# Patient Record
Sex: FEMALE | Race: WHITE | NOT HISPANIC OR LATINO | Employment: UNEMPLOYED | URBAN - METROPOLITAN AREA
[De-identification: names, ages, dates, MRNs, and addresses within clinical notes are randomized per-mention and may not be internally consistent; named-entity substitution may affect disease eponyms.]

---

## 2019-05-10 ENCOUNTER — APPOINTMENT (OUTPATIENT)
Dept: LAB | Facility: CLINIC | Age: 24
End: 2019-05-10
Attending: OBSTETRICS & GYNECOLOGY
Payer: COMMERCIAL

## 2019-05-10 ENCOUNTER — OFFICE VISIT (OUTPATIENT)
Dept: OBGYN | Facility: CLINIC | Age: 24
End: 2019-05-10
Attending: OBSTETRICS & GYNECOLOGY
Payer: COMMERCIAL

## 2019-05-10 VITALS
HEART RATE: 78 BPM | SYSTOLIC BLOOD PRESSURE: 118 MMHG | DIASTOLIC BLOOD PRESSURE: 78 MMHG | WEIGHT: 133.5 LBS | BODY MASS INDEX: 22.24 KG/M2 | HEIGHT: 65 IN

## 2019-05-10 DIAGNOSIS — Z12.4 SCREENING FOR MALIGNANT NEOPLASM OF CERVIX: ICD-10-CM

## 2019-05-10 DIAGNOSIS — Z01.419 ENCOUNTER FOR GYNECOLOGICAL EXAMINATION WITHOUT ABNORMAL FINDING: ICD-10-CM

## 2019-05-10 DIAGNOSIS — Z00.00 VISIT FOR PREVENTIVE HEALTH EXAMINATION: ICD-10-CM

## 2019-05-10 DIAGNOSIS — Z12.39 BREAST CANCER SCREENING: ICD-10-CM

## 2019-05-10 DIAGNOSIS — R10.2 PELVIC PAIN IN FEMALE: Primary | ICD-10-CM

## 2019-05-10 PROCEDURE — 86481 TB AG RESPONSE T-CELL SUSP: CPT | Performed by: STUDENT IN AN ORGANIZED HEALTH CARE EDUCATION/TRAINING PROGRAM

## 2019-05-10 PROCEDURE — 36415 COLL VENOUS BLD VENIPUNCTURE: CPT | Performed by: STUDENT IN AN ORGANIZED HEALTH CARE EDUCATION/TRAINING PROGRAM

## 2019-05-10 PROCEDURE — G0145 SCR C/V CYTO,THINLAYER,RESCR: HCPCS | Performed by: STUDENT IN AN ORGANIZED HEALTH CARE EDUCATION/TRAINING PROGRAM

## 2019-05-10 ASSESSMENT — ANXIETY QUESTIONNAIRES
5. BEING SO RESTLESS THAT IT IS HARD TO SIT STILL: NOT AT ALL
1. FEELING NERVOUS, ANXIOUS, OR ON EDGE: MORE THAN HALF THE DAYS
3. WORRYING TOO MUCH ABOUT DIFFERENT THINGS: SEVERAL DAYS
2. NOT BEING ABLE TO STOP OR CONTROL WORRYING: SEVERAL DAYS
6. BECOMING EASILY ANNOYED OR IRRITABLE: NOT AT ALL
7. FEELING AFRAID AS IF SOMETHING AWFUL MIGHT HAPPEN: NOT AT ALL
GAD7 TOTAL SCORE: 4

## 2019-05-10 ASSESSMENT — PATIENT HEALTH QUESTIONNAIRE - PHQ9
SUM OF ALL RESPONSES TO PHQ QUESTIONS 1-9: 3
5. POOR APPETITE OR OVEREATING: NOT AT ALL

## 2019-05-10 ASSESSMENT — MIFFLIN-ST. JEOR: SCORE: 1361.43

## 2019-05-10 NOTE — PATIENT INSTRUCTIONS
Results of pap smear will be available in about 5-7 days. We ordered at CT of your abdomen/pelvis today.       PREVENTIVE HEALTH RECOMMENDATIONS:   Most women need a yearly breast and pelvic exam.    A PAP screen, a test done DURING a pelvic exam, is NO longer recommended yearly.    March 2013, screening guidelines recommended by ACOG for PAP screen are:    1) First pap at age 21.    2) Pap every 3 years until age 30.    3) After age 30, pap every 3 years or Pap with HR HPV screen every 5 years until age 65.  4) Women do NOT need a vaginal Pap screen after a hysterectomy (surgical removal of the uterus) when they have not had cancer.    Exceptions:  1) Yearly pap if HIV+ or immunosuppressed secondary to organ transplant  2) ZANDER II-III continue routine screening for 20 years.    I encourage you continue looking for opportunities to choose a healthy lifestyle:       * Choose to eat a heart healthy diet. Check out the FOOD PLATE guidelines at: http://www.[employer] Bio[employer]plate.gov/ for helpful hints on weight and cholesterol management.  Balance your caloric intake with exercise to maintain a BMI in the 22 to 26 range. For bone health: Eat calcium-rich foods like yogurt, broccoli or take chewable calcium pills (500 to 600 mg) twice a day with food.       * Exercise for at least an average of 30 minutes a day, 5 days of the week. This will help you control your weight, release stress, and help prevent disease.      * Take a Vitamin D3 supplement daily fall through spring and during summer unless you pmxy35-87' full body sun exposure to skin without sunscreen.      * DO wear sunscreen to prevent skin cancer after the first 15-30 minutes.      * Identify stressors in your life, find ways to release the stress, and, make time for yourself. PLEASE ask for help if mood changes last longer than two weeks.     * Limit alcohol to one drink per day.  No smoking.  Avoid second hand smoke. If you smoke, ask for help to stop.       *  If  you are in a sexual relationship, talk with your partner about possible infection risks and take action to protect yourself from exposure to a sexual infection.    Please request an infection screen for STIs (sexually transmitted infections) if you are less than age 26 OR believe that you may be at risk.     Get a flu shot each year. Get a tetanus shot every 10 years. EVERYONE needs a pertussis (Whooping cough) booster.    See your dentist twice a year for an exam and preventive care cleaning.     Consider the following screen tests:    1) cholesterol test every 5 years.     2) yearly mammogram after age 40 unless you have identified risks.    3) colonoscopy every 10 years after age 50 unless you have identified risks.    4) diabetes blood test screening if you are at risk for diabetes.      Additional information that you may also find helpful:  The Internet now gives us access to LOTS of information -- some of it helpful, research documented and also plenty of harmful, anecdotal information that may not pertain to your situtaion. Consider visiting the following websites for accurate health information:    www.vitamindcouncil.org/ : Info and ongoing research re Vitamin D    www.fairview.org : Up to date and easily searchable information on multiple topics.    www.medlineplus.gov : medication info, interactive tutorials, watch real surgeries online    www.cdc.gov : public health info, travel advisories, epidemics (H1N1)    www.manolo/std.org: current research re diagnosis, treatment and prevention of sexually contacted infections.    www.health.state.[place] : [place] dept of heatl, public health issues in [place], N1N1    www.familydoctor.org : good info from the Academy of Family Physicians

## 2019-05-10 NOTE — LETTER
5/10/2019     RE: Radha Blanco  [address] [id number]     Dear Colleague,    Thank you for referring your patient, Radha Blanco, to the [facility] at [facility]. Please see a copy of my visit note below.    [facility]  Annual Exam    HPI:    Radha Blanco is a 23 year old G0 young woman, here for well woman exam. Reports she is overall doing well, but has been having cyclical abdominal-pelvic pain. Pain starts between day 6-15 of menstrual cycle. Starts at the umbilicus and then migrates to RLQ consistently. Starts dull and then becomes sharp. Lasts for a few hours (up to 12 hours), improves some with ibuprofen and heat. Feels better if she rests and lays in fetal position. Associated with nausea but no vomiting. Intermittently has diarrhea but no constipation. Denies any dysuria, pain during periods, intermenstrual bleeding, abnormal discharge. Also has low back pain that is not associated with this pain. Feels like it is worse after sitting for long periods of time.     GYN History  - LMP: Patient's last menstrual period was 2019.  - Menses: Menarche at 14, cycles q 25-26 days, lasting 3 days, with light flow, no clots. PMS symptoms: headaches, premenstrual cramps (mild), depressed mood that is only for a few days before period  - Pap Smears:  No pap smear. HPV vaccine x3.  - Contraception: Never used.  - Sexual Activity: Not currently. Has never been sexually active.   - Hx UTIs: No    OBHx  OB History    Para Term  AB Living   0 0 0 0 0 0   SAB TAB Ectopic Multiple Live Births   0 0 0 0 0       Past Medical History  Past Medical History:   Diagnosis Date     NO ACTIVE PROBLEMS        Past Surgical History  Past Surgical History:   Procedure Laterality Date     SURGICAL HISTORY OF -       oral surgery       Medications  Current Outpatient Medications   Medication     epinephrine (EPIPEN JR) 0.15 MG/0.3ML  YAN     No current facility-administered medications for this visit.        Allergies  Bees, anaphylaxis     Social History  Pursuing Master's of Theology. Graduated with Bachelor of Civil Engineering    Lives in [facility] roommate moving in  Family in [place]    Family History  Family History   Problem Relation Age of Onset     Diabetes Mother         gestational only     Asthma Mother      Hypertension Maternal Grandmother      Diabetes Maternal Grandmother      C.A.D. Maternal Grandfather 49        cardiac arrest      Hypertension Paternal Grandmother      Cancer Paternal Grandfather         lung, brain and liver - smoker     Cerebrovascular Disease Maternal Aunt 40        vertebrae occlusion- stroke     Asthma Paternal Uncle        ROS: Review Of Systems  Skin: negative for, acne, rash, itching, lumps or bumps  Eyes: negative for, visual blurring, double vision, eye pain, wears contacts  Ears/Nose/Throat: negative for, nasal congestion, hearing loss, sinus trouble, dental problem  Respiratory: No shortness of breath, dyspnea on exertion, cough, or hemoptysis  Cardiovascular: negative for, palpitations, chest pain, dyspnea on exertion and orthopnea  Gastrointestinal: negative for, poor appetite, heartburn, +nausea and occasional diarrhea  Genitourinary: negative for, nocturia, dysuria, frequency, urgency and hematuria  Musculoskeletal: negative for, neck pain, joint pain and joint swelling, +low back pain  Neurologic: negative for, headaches, syncope and local weakness  Psychiatric: negative for, feeling anxious, thoughts of self-harm and abusive relationship  Hematologic/Lymphatic/Immunologic: negative for, bleeding disorder, night sweats and weight loss  Endocrine: negative for, cold intolerance, heat intolerance and polydipsia    Preventative Health  Current or historical sexual, physical or mental abuse: No  Feelings of depression: Intermittently before periods, no thoughts of harm and  "does not cause her much distress, able to contract for safety  Seat belt usage: Yes  Diet: Well balanced,   Exercise: 30 minutes, 4-5x per week    Physical Exam  /78   Pulse 78   Ht 1.651 m (5' 5\")   Wt 60.6 kg (133 lb 8 oz)   LMP 05/01/2019   BMI 22.22 kg/m      /72 HR 78   Gen: Well-appearing, NAD  HEENT: Normocephalic, atraumatic, no oral lesions, normal dentition  Neck: Thyroid is not enlarged, no appreciable masses palpated. Non-tender  CV:  RRR, no m/r/g auscultated  Pulm: CTAB, no w/r/r auscultated  Abd: Soft, non-tender, non-distended. No hepatomegaly. No masses. Pain non-reproducible.  Ext: No LE edema, extremities warm and well perfused  Neuro: CNs intact, normal strength in BL UE and LE, normal sensation to light touch, normal gait  Skin:   Few normal nevi on back, no rash or scaling    Breast: Symmetric, no nipple discharge or retraction, no axillary lymphadenopathy, no palpable nodules or masses bilaterally    Pelvic:  Normal appearing external female genitalia. Normal hair distribution. Vagina is without lesions. Thin, clear-white discharge. Cervix non-parous, no lesions, no cervical motion tenderness. Uterus is small, mobile, non-tender. No adnexal tenderness or masses. Pelvic pain non-reproducible.    Current BMI: Body mass index is 22.22 kg/m .  --Normal weight = 18.5-24.9    Assessment/Plan  Radha Blanco is a 23 year old G0 female here for annual exam and with pelvic pain of unknown etiology.      Routine Health Maintenance:   - No prior pap. Pap collected today.   - Lab Work:  Quant GOLD   - Normal clinical breast and pelvic exam  - s/p HPV vaccination series    Pelvic pain: RLQ pain that is somewhat cyclical. Suspect GI vs ovarian etiology. Likely due to normal, monthly ovulation. Previously evaluated with transabdominal US in [place], but patient does not have results. Obviously pain not likely due to torsion, PID, TOA as she was not admitted or treated for her pain and no " previous sexual activity. Pelvic exam and vital signs normal today. Associated nausea and the nature of her pain (starts umbilical and localizes to RLQ consistently) makes a GI source, like chronic appendicitis, a possibility.  - CT A/P ordered. Patient will get CT in [place] and then release results     Follow Up: In one year for annual, or sooner as needed    Patient discussed and staffed with Dr. Dcelan Rothman MD, MPH  OBGYN PGY-1  5/10/2019 1:56 PM     I have seen and examined the patient with the resident. I have reviewed, edited, and agree with the note.   My findings are: appears well. Abdomen: soft NT ND  EG wnl vagina wnl cervix is small and nulliparous.   Pat Manriquez MD

## 2019-05-10 NOTE — PROGRESS NOTES
[facility]  Annual Exam    HPI:    Radha Blanco is a 23 year old G0 young woman, here for well woman exam. Reports she is overall doing well, but has been having cyclical abdominal-pelvic pain. Pain starts between day 6-15 of menstrual cycle. Starts at the umbilicus and then migrates to RLQ consistently. Starts dull and then becomes sharp. Lasts for a few hours (up to 12 hours), improves some with ibuprofen and heat. Feels better if she rests and lays in fetal position. Associated with nausea but no vomiting. Intermittently has diarrhea but no constipation. Denies any dysuria, pain during periods, intermenstrual bleeding, abnormal discharge. Also has low back pain that is not associated with this pain. Feels like it is worse after sitting for long periods of time.     GYN History  - LMP: Patient's last menstrual period was 2019.  - Menses: Menarche at 14, cycles q 25-26 days, lasting 3 days, with light flow, no clots. PMS symptoms: headaches, premenstrual cramps (mild), depressed mood that is only for a few days before period  - Pap Smears:  No pap smear. HPV vaccine x3.  - Contraception: Never used.  - Sexual Activity: Not currently. Has never been sexually active.   - Hx UTIs: No    OBHx  OB History    Para Term  AB Living   0 0 0 0 0 0   SAB TAB Ectopic Multiple Live Births   0 0 0 0 0       Past Medical History  Past Medical History:   Diagnosis Date     NO ACTIVE PROBLEMS        Past Surgical History  Past Surgical History:   Procedure Laterality Date     SURGICAL HISTORY OF -       oral surgery       Medications  Current Outpatient Medications   Medication     epinephrine (EPIPEN JR) 0.15 MG/0.3ML YAN     No current facility-administered medications for this visit.        Allergies  Bees, anaphylaxis     Social History  Pursuing Master's of Theology. Graduated with Bachelor of Civil Engineering    Lives in [facility] roommate moving in  Family in [facility]  "History  Family History   Problem Relation Age of Onset     Diabetes Mother         gestational only     Asthma Mother      Hypertension Maternal Grandmother      Diabetes Maternal Grandmother      C.A.D. Maternal Grandfather 49        cardiac arrest      Hypertension Paternal Grandmother      Cancer Paternal Grandfather         lung, brain and liver - smoker     Cerebrovascular Disease Maternal Aunt 40        vertebrae occlusion- stroke     Asthma Paternal Uncle        ROS: Review Of Systems  Skin: negative for, acne, rash, itching, lumps or bumps  Eyes: negative for, visual blurring, double vision, eye pain, wears contacts  Ears/Nose/Throat: negative for, nasal congestion, hearing loss, sinus trouble, dental problem  Respiratory: No shortness of breath, dyspnea on exertion, cough, or hemoptysis  Cardiovascular: negative for, palpitations, chest pain, dyspnea on exertion and orthopnea  Gastrointestinal: negative for, poor appetite, heartburn, +nausea and occasional diarrhea  Genitourinary: negative for, nocturia, dysuria, frequency, urgency and hematuria  Musculoskeletal: negative for, neck pain, joint pain and joint swelling, +low back pain  Neurologic: negative for, headaches, syncope and local weakness  Psychiatric: negative for, feeling anxious, thoughts of self-harm and abusive relationship  Hematologic/Lymphatic/Immunologic: negative for, bleeding disorder, night sweats and weight loss  Endocrine: negative for, cold intolerance, heat intolerance and polydipsia    Preventative Health  Current or historical sexual, physical or mental abuse: No  Feelings of depression: Intermittently before periods, no thoughts of harm and does not cause her much distress, able to contract for safety  Seat belt usage: Yes  Diet: Well balanced,   Exercise: 30 minutes, 4-5x per week    Physical Exam  /78   Pulse 78   Ht 1.651 m (5' 5\")   Wt 60.6 kg (133 lb 8 oz)   LMP 05/01/2019   BMI 22.22 kg/m     /72 HR 78 "   Gen: Well-appearing, NAD  HEENT: Normocephalic, atraumatic, no oral lesions, normal dentition  Neck: Thyroid is not enlarged, no appreciable masses palpated. Non-tender  CV:  RRR, no m/r/g auscultated  Pulm: CTAB, no w/r/r auscultated  Abd: Soft, non-tender, non-distended. No hepatomegaly. No masses. Pain non-reproducible.  Ext: No LE edema, extremities warm and well perfused  Neuro: CNs intact, normal strength in BL UE and LE, normal sensation to light touch, normal gait  Skin:   Few normal nevi on back, no rash or scaling    Breast: Symmetric, no nipple discharge or retraction, no axillary lymphadenopathy, no palpable nodules or masses bilaterally    Pelvic:  Normal appearing external female genitalia. Normal hair distribution. Vagina is without lesions. Thin, clear-white discharge. Cervix non-parous, no lesions, no cervical motion tenderness. Uterus is small, mobile, non-tender. No adnexal tenderness or masses. Pelvic pain non-reproducible.    Current BMI: Body mass index is 22.22 kg/m .  --Normal weight = 18.5-24.9    Assessment/Plan  Radha Blanco is a 23 year old G0 female here for annual exam and with pelvic pain of unknown etiology.      Routine Health Maintenance:   - No prior pap. Pap collected today.   - Lab Work:  Quant GOLD   - Normal clinical breast and pelvic exam  - s/p HPV vaccination series    Pelvic pain: RLQ pain that is somewhat cyclical. Suspect GI vs ovarian etiology. Likely due to normal, monthly ovulation. Previously evaluated with transabdominal US in [place], but patient does not have results. Obviously pain not likely due to torsion, PID, TOA as she was not admitted or treated for her pain and no previous sexual activity. Pelvic exam and vital signs normal today. Associated nausea and the nature of her pain (starts umbilical and localizes to RLQ consistently) makes a GI source, like chronic appendicitis, a possibility.  - CT A/P ordered. Patient will get CT in [place] and then release  results     Follow Up: In one year for annual, or sooner as needed    Patient discussed and staffed with Dr. Declan Rothman MD, MPH  OBGYN PGY-1  5/10/2019 1:56 PM     I have seen and examined the patient with the resident. I have reviewed, edited, and agree with the note.   My findings are: appears well. Abdomen: soft NT ND  EG wnl vagina wnl cervix is small and nulliparous.   Pat Manriquez MD

## 2019-05-11 ASSESSMENT — ANXIETY QUESTIONNAIRES: GAD7 TOTAL SCORE: 4

## 2019-05-13 LAB
GAMMA INTERFERON BACKGROUND BLD IA-ACNC: 0.22 IU/ML
M TB IFN-G BLD-IMP: NEGATIVE
M TB IFN-G CD4+ BCKGRND COR BLD-ACNC: >10 IU/ML
MITOGEN IGNF BCKGRD COR BLD-ACNC: 0 IU/ML
MITOGEN IGNF BCKGRD COR BLD-ACNC: 0.01 IU/ML

## 2019-05-15 LAB
COPATH REPORT: NORMAL
PAP: NORMAL

## 2019-07-06 ENCOUNTER — OFFICE VISIT (OUTPATIENT)
Dept: URGENT CARE | Facility: URGENT CARE | Age: 24
End: 2019-07-06
Payer: COMMERCIAL

## 2019-07-06 VITALS
BODY MASS INDEX: 21.52 KG/M2 | TEMPERATURE: 97.9 F | HEART RATE: 77 BPM | DIASTOLIC BLOOD PRESSURE: 74 MMHG | RESPIRATION RATE: 16 BRPM | WEIGHT: 129.3 LBS | SYSTOLIC BLOOD PRESSURE: 110 MMHG | OXYGEN SATURATION: 100 %

## 2019-07-06 DIAGNOSIS — B96.89 BV (BACTERIAL VAGINOSIS): ICD-10-CM

## 2019-07-06 DIAGNOSIS — R10.84 ABDOMINAL PAIN, GENERALIZED: Primary | ICD-10-CM

## 2019-07-06 DIAGNOSIS — N76.0 BV (BACTERIAL VAGINOSIS): ICD-10-CM

## 2019-07-06 LAB
ALBUMIN SERPL-MCNC: 3.9 G/DL (ref 3.4–5)
ALBUMIN UR-MCNC: NEGATIVE MG/DL
ALP SERPL-CCNC: 80 U/L (ref 40–150)
ALT SERPL W P-5'-P-CCNC: 42 U/L (ref 0–50)
ANION GAP SERPL CALCULATED.3IONS-SCNC: 4 MMOL/L (ref 3–14)
APPEARANCE UR: CLEAR
AST SERPL W P-5'-P-CCNC: 29 U/L (ref 0–45)
BASOPHILS # BLD AUTO: 0 10E9/L (ref 0–0.2)
BASOPHILS NFR BLD AUTO: 0.4 %
BILIRUB SERPL-MCNC: 0.2 MG/DL (ref 0.2–1.3)
BILIRUB UR QL STRIP: NEGATIVE
BUN SERPL-MCNC: 14 MG/DL (ref 7–30)
CALCIUM SERPL-MCNC: 8.9 MG/DL (ref 8.5–10.1)
CHLORIDE SERPL-SCNC: 106 MMOL/L (ref 94–109)
CO2 SERPL-SCNC: 30 MMOL/L (ref 20–32)
COLOR UR AUTO: YELLOW
CREAT SERPL-MCNC: 0.78 MG/DL (ref 0.52–1.04)
DIFFERENTIAL METHOD BLD: NORMAL
EOSINOPHIL # BLD AUTO: 0.3 10E9/L (ref 0–0.7)
EOSINOPHIL NFR BLD AUTO: 5.6 %
ERYTHROCYTE [DISTWIDTH] IN BLOOD BY AUTOMATED COUNT: 12.7 % (ref 10–15)
GFR SERPL CREATININE-BSD FRML MDRD: >90 ML/MIN/{1.73_M2}
GLUCOSE SERPL-MCNC: 89 MG/DL (ref 70–99)
GLUCOSE UR STRIP-MCNC: NEGATIVE MG/DL
HCT VFR BLD AUTO: 40.5 % (ref 35–47)
HETEROPH AB SER QL: NEGATIVE
HGB BLD-MCNC: 13.4 G/DL (ref 11.7–15.7)
HGB UR QL STRIP: NEGATIVE
KETONES UR STRIP-MCNC: NEGATIVE MG/DL
LEUKOCYTE ESTERASE UR QL STRIP: NEGATIVE
LYMPHOCYTES # BLD AUTO: 1.8 10E9/L (ref 0.8–5.3)
LYMPHOCYTES NFR BLD AUTO: 32.2 %
MCH RBC QN AUTO: 29.3 PG (ref 26.5–33)
MCHC RBC AUTO-ENTMCNC: 33.1 G/DL (ref 31.5–36.5)
MCV RBC AUTO: 88 FL (ref 78–100)
MONOCYTES # BLD AUTO: 1 10E9/L (ref 0–1.3)
MONOCYTES NFR BLD AUTO: 17.6 %
NEUTROPHILS # BLD AUTO: 2.4 10E9/L (ref 1.6–8.3)
NEUTROPHILS NFR BLD AUTO: 44.2 %
NITRATE UR QL: NEGATIVE
PH UR STRIP: 6.5 PH (ref 5–7)
PLATELET # BLD AUTO: 317 10E9/L (ref 150–450)
POTASSIUM SERPL-SCNC: 4.2 MMOL/L (ref 3.4–5.3)
PROT SERPL-MCNC: 8 G/DL (ref 6.8–8.8)
RBC # BLD AUTO: 4.58 10E12/L (ref 3.8–5.2)
SODIUM SERPL-SCNC: 140 MMOL/L (ref 133–144)
SOURCE: NORMAL
SP GR UR STRIP: 1.01 (ref 1–1.03)
SPECIMEN SOURCE: ABNORMAL
UROBILINOGEN UR STRIP-ACNC: 0.2 EU/DL (ref 0.2–1)
WBC # BLD AUTO: 5.6 10E9/L (ref 4–11)
WET PREP SPEC: ABNORMAL

## 2019-07-06 PROCEDURE — 36415 COLL VENOUS BLD VENIPUNCTURE: CPT | Performed by: PHYSICIAN ASSISTANT

## 2019-07-06 PROCEDURE — 99203 OFFICE O/P NEW LOW 30 MIN: CPT | Performed by: PHYSICIAN ASSISTANT

## 2019-07-06 PROCEDURE — 87210 SMEAR WET MOUNT SALINE/INK: CPT | Performed by: PHYSICIAN ASSISTANT

## 2019-07-06 PROCEDURE — 81003 URINALYSIS AUTO W/O SCOPE: CPT | Performed by: PHYSICIAN ASSISTANT

## 2019-07-06 PROCEDURE — 85025 COMPLETE CBC W/AUTO DIFF WBC: CPT | Performed by: PHYSICIAN ASSISTANT

## 2019-07-06 PROCEDURE — 86308 HETEROPHILE ANTIBODY SCREEN: CPT | Performed by: PHYSICIAN ASSISTANT

## 2019-07-06 PROCEDURE — 80053 COMPREHEN METABOLIC PANEL: CPT | Performed by: PHYSICIAN ASSISTANT

## 2019-07-06 RX ORDER — METRONIDAZOLE 500 MG/1
500 TABLET ORAL 2 TIMES DAILY
Qty: 14 TABLET | Refills: 0 | Status: SHIPPED | OUTPATIENT
Start: 2019-07-06 | End: 2019-08-19

## 2019-07-06 NOTE — PATIENT INSTRUCTIONS
Better in 2-3 days    If not follow     Red flags and emergent follow up discussed, and understood by patient  Follow up with PCP if symptoms worsen or fail to improve        Patient Education     Bacterial Vaginosis    You have a vaginal infection called bacterial vaginosis (BV). Both good and bad bacteria are present in a healthy vagina. BV occurs when these bacteria get out of balance. The number of bad bacteria increase. And the number of good bacteria decrease. Although BV is associated with sexual activity, it is not a sexually transmitted disease.  BV may or may not cause symptoms. If symptoms do occur, they can include:    Thin, gray, milky-white, or sometimes green discharge    Unpleasant odor or  fishy  smell    Itching, burning, or pain in or around the vagina  It is not known what causes BV, but certain factors can make the problem more likely. This can include:    Douching    Having sex with a new partner    Having sex with more than one partner  BV will sometimes go away on its own. But treatment is usually recommended. This is because untreated BV can increase the risk of more serious health problems such as:    Pelvic inflammatory disease (PID)     delivery (giving birth to a baby early if you re pregnant)    HIV and certain other sexually transmitted diseases (STDs)    Infection after surgery on the reproductive organs  Home care  General care    BV is most often treated with medicines called antibiotics. These may be given as pills or as a vaginal cream. If antibiotics are prescribed, be sure to use them exactly as directed. Also, be sure to complete all of the medicine, even if your symptoms go away.    Don't douche or having sex during treatment.    If you have sex with a female partner, ask your healthcare provider if she should also be treated.  Prevention    Don't douche.    Don't have sex. If you do have sex, then take steps to lower your risk:  ? Use condoms when having sex.  ? Limit  the number of sexual partners you have.  Follow-up care  Follow up with your healthcare provider, or as advised.  When to seek medical advice  Call your healthcare provider right away if:    You have a fever of 100.4 F (38 C) or higher, or as directed by your provider.    Your symptoms worsen, or they don t go away within a few days of starting treatment.    You have new pain in the lower belly or pelvic region.    You have side effects that bother you or a reaction to the pills or cream you re prescribed.    You or any partners you have sex with have new symptoms, such as a rash, joint pain, or sores.  Date Last Reviewed: 10/1/2017    4219-8718 [employer] [address] [place] [address], [place] [address]. All rights reserved. This information is not intended as a substitute for professional medical care. Always follow your healthcare professional's instructions.           Patient Education     Unknown Causes of Abdominal Pain (Female)    The exact cause of your belly (abdominal) pain is not clear. This does not mean that this is something to worry about. Everyone likes to know the exact cause of the problem. But sometimes with belly pain, there is no clear-cut cause, and this could be a good thing. The good news is that your symptoms can be treated, and you will feel better.   Your condition does not seem serious now. But sometimes the signs of a serious problem may take more time to appear. For this reason, it is important for you to watch for any new symptoms, problems, or worsening of your condition.  Over the next few days, the abdominal pain may come and go. Or it may be constant. Other common symptoms can include nausea and vomiting. Sometimes it can be difficult to tell if you feel nauseous. You may just feel bad and not connect that feeling to nausea. Constipation, diarrhea, and a fever may go along with the pain.  The pain may continue even if treated correctly over the following days. Depending on  how things go, sometimes the cause can become clear and may need more or different treatment. Additional evaluations, medicines, or tests may also be needed.  Home care  Your healthcare provider may prescribe medicine for pain, symptoms, or an infection.  Follow the healthcare provider's instructions for taking these medicines.  General care    Rest as much as you can until your next exam. No strenuous activities.    Try to find positions that ease discomfort. A small pillow placed on the abdomen may help relieve pain.    Something warm on your abdomen (such as a heating pad) may help, but be careful not to burn yourself.  Diet    Don t force yourself to eat, especially if having cramps, vomiting, or diarrhea.    Water is important so you don't get dehydrated. Soup may also be good. Sports drinks may also help, especially if they are not too acidic. Don't drink sugary drinks as this can make things worse. Take liquids in small amounts. Don t guzzle them.    Caffeine sometimes makes the pain and cramping worse.    Don t take dairy products if you have vomiting or diarrhea.    Don't eat large amounts at a time. Wait a few minutes between bites.    Eat a diet low in fiber (called a low-residue diet). Foods allowed include refined breads, white rice, fruit and vegetable juices without pulp, tender meats. These foods will pass more easily through the intestine.    Don t have whole-grain foods, whole fruits and vegetables, meats, seeds and nuts, fried or fatty foods, dairy, alcohol and spicy foods until your symptoms go away.  Follow-up care  Follow up with your healthcare provider, or as advised, if your pain does not begin to improve in the next 24 hours.  Call 911  Call 911 if any of these occur:    Trouble breathing    Confusion    Fainting or loss of consciousness    Rapid heart rate    Seizure  When to seek medical advice  Call your healthcare provider right away if any of these occur:    Pain gets worse or moves to  the right lower abdomen    New or worsening vomiting or diarrhea    Swelling of the abdomen    Unable to pass stool for more than 3 days    Fever of 100.4 F (38 C) or higher, or as directed by your healthcare provider.    Blood in vomit or bowel movements (dark red or black color)    Yellow color of eyes and skin (jaundice)    Weakness, dizziness    Chest, arm, back, neck, or jaw pain    Unexpected vaginal bleeding or missed period    Can't keep down liquids or water and you are getting dehydrated  Date Last Reviewed: 6/1/2018 2000-2018 [employer]. [address] [place] [address]. All rights reserved. This information is not intended as a substitute for professional medical care. Always follow your healthcare professional's instructions.

## 2019-07-06 NOTE — PROGRESS NOTES
SUBJECTIVE  HPI:  Radha Blanco is a 23 year old female who presents with the CC of abdominal pain.  On and off pain for years.  Now occurring consistently over the last 7 days.  Episodes lasting 15-20 minutes. Diarrhea.  No urinary changes. No vaginal symptoms.  LMP in 6/15.  No fever, vomiting.  At its worst an 8/10.  Currently 2/10.  Pain is sharp, but diffuse.    Fetal position helps a little, nothing else.  Eating makes it worse.  No blood.  Last BM was last night.   No GI meds.  Has not tried anything for GI.      Primary in [place]    Past Medical History:   Diagnosis Date     NO ACTIVE PROBLEMS      Current Outpatient Medications   Medication Sig Dispense Refill     epinephrine (EPIPEN JR) 0.15 MG/0.3ML YAN inject  as directed.       Social History     Tobacco Use     Smoking status: Never Smoker     Smokeless tobacco: Never Used   Substance Use Topics     Alcohol use: No       ROS:  Review of systems negative except as stated above.    OBJECTIVE:  /74   Pulse 77   Temp 97.9  F (36.6  C) (Oral)   Resp 16   Wt 58.7 kg (129 lb 4.8 oz)   LMP 06/15/2019   SpO2 100%   BMI 21.52 kg/m    GENERAL APPEARANCE: healthy, alert and no distress  EYES: conjunctiva clear  HENT: ear canals and TM's normal.  Nose and mouth without ulcers, erythema or lesions  NECK: supple, nontender, no lymphadenopathy  RESP: lungs clear to auscultation - no rales, rhonchi or wheezes  CV: regular rates and rhythm, normal S1 S2, no murmur noted  ABDOMEN:  soft, nontender, no HSM or masses and bowel sounds normal  NEURO: Normal strength and tone  SKIN: no suspicious lesions or rashes    Results for orders placed or performed in visit on 07/06/19   CBC with platelets and differential   Result Value Ref Range    WBC 5.6 4.0 - 11.0 10e9/L    RBC Count 4.58 3.8 - 5.2 10e12/L    Hemoglobin 13.4 11.7 - 15.7 g/dL    Hematocrit 40.5 35.0 - 47.0 %    MCV 88 78 - 100 fl    MCH 29.3 26.5 - 33.0 pg    MCHC 33.1 31.5 - 36.5 g/dL    RDW 12.7  10.0 - 15.0 %    Platelet Count 317 150 - 450 10e9/L    Diff Method PENDING    *UA reflex to Microscopic and Culture ([facility] (except [place] and [place])   Result Value Ref Range    Color Urine Yellow     Appearance Urine Clear     Glucose Urine Negative NEG^Negative mg/dL    Bilirubin Urine Negative NEG^Negative    Ketones Urine Negative NEG^Negative mg/dL    Specific Gravity Urine 1.010 1.003 - 1.035    Blood Urine Negative NEG^Negative    pH Urine 6.5 5.0 - 7.0 pH    Protein Albumin Urine Negative NEG^Negative mg/dL    Urobilinogen Urine 0.2 0.2 - 1.0 EU/dL    Nitrite Urine Negative NEG^Negative    Leukocyte Esterase Urine Negative NEG^Negative    Source Midstream Urine    Wet prep   Result Value Ref Range    Specimen Description Vagina     Wet Prep No Trichomonas seen     Wet Prep Moderate  Clue cells seen   (A)     Wet Prep No yeast seen     Wet Prep No WBC's seen          ASSESSMENT:  (R10.84) Abdominal pain, generalized  (primary encounter diagnosis)  Plan: CBC with platelets and differential, *UA reflex        to Microscopic and Culture ([facility] (except [facility]), Wet         prep, Comprehensive metabolic panel (BMP + Alb,        Alk Phos, ALT, AST, Total. Bili, TP),         Mononucleosis screen          (N76.0,  B96.89) BV (bacterial vaginosis)  Plan: metroNIDAZOLE (FLAGYL) 500 MG tablet      Red flags and emergent follow up discussed, and understood by patient  Follow up with PCP if symptoms worsen or fail to improve

## 2019-08-13 ENCOUNTER — TELEPHONE (OUTPATIENT)
Dept: FAMILY MEDICINE | Facility: CLINIC | Age: 24
End: 2019-08-13

## 2019-08-13 NOTE — TELEPHONE ENCOUNTER
Non-detailed message left for patient to return call.  ROHIT CadenaN, RN  [facility] T[facility]

## 2019-08-14 ENCOUNTER — TELEPHONE (OUTPATIENT)
Dept: GASTROENTEROLOGY | Facility: CLINIC | Age: 24
End: 2019-08-14

## 2019-08-14 NOTE — TELEPHONE ENCOUNTER
[facility] Call Center    Phone Message    May a detailed message be left on voicemail: yes    Reason for Call: Other: Pt calling in to get scheduled for an appointment for a second opinion. Pt has pain in abdomen that starts in upper and travels down to lower right quadrant. Pt is having records sent to clinic. Please follow up when available. Thank you     Action Taken: Message routed to:  [facility] ([facility] Gastro

## 2019-08-15 NOTE — TELEPHONE ENCOUNTER
Attempt #2.  Non-detailed message left for patient to return call.  Please see telephone encounter from 8/14/2019.  ROHIT CadenaN, RN  [facility] [facility]

## 2019-08-16 ENCOUNTER — NURSE TRIAGE (OUTPATIENT)
Dept: FAMILY MEDICINE | Facility: CLINIC | Age: 24
End: 2019-08-16

## 2019-08-16 NOTE — TELEPHONE ENCOUNTER
Pt has been seen for this in the past, had an ultrasound at [facility] in [facility] but did not receive the results. Findings unknown. Pt is unsure of what is the cause as she has tried multiple things (diet, OTC medication) with no relief.    Pt scheduled on acute basis with ADELA ARCINIEGA d/t other provider (SELENE) did not have an opening until late in month. Pt stated she does not want to wait that long.     Additional Information    Negative: Passed out (i.e., fainted, collapsed and was not responding)    Negative: Shock suspected (e.g., cold/pale/clammy skin, too weak to stand, low BP, rapid pulse)    Negative: Sounds like a life-threatening emergency to the triager    Negative: Chest pain    Negative: Pain is mainly in upper abdomen (if needed ask: 'is it mainly above the belly button?')    Negative: Abdominal pain and pregnant > 20 weeks    Negative: Abdominal pain and pregnant < 20 weeks    Negative: SEVERE abdominal pain (e.g., excruciating)    Negative: Vomiting red blood or black (coffee ground) material    Negative: Bloody, black, or tarry bowel movements    Negative: Constant abdominal pain lasting > 2 hours    Negative: Vomiting bile (green color)    Negative: Patient sounds very sick or weak to the triager    Negative: Vomiting and abdomen looks much more swollen than usual    Negative: White of the eyes have turned yellow (i.e., jaundice)    Negative: Blood in urine (red, pink, or tea-colored)    Negative: Fever > 103 F (39.4 C)    Negative: Fever > 101 F (38.3 C) and over 60 years of age    Negative: Fever > 100.0 F (37.8 C) and has diabetes mellitus or a weak immune system (e.g., HIV positive, cancer chemotherapy, organ transplant, splenectomy, chronic steroids)    Negative: Fever > 100.0 F (37.8 C) and bedridden (e.g., nursing home patient, stroke, chronic illness, recovering from surgery)    Negative: Pregnant or could be pregnant (i.e., missed last menstrual period)    Negative: MODERATE OR MILD pain  "that comes and goes (cramps) lasts > 24 hours    Negative: Unusual vaginal discharge    Negative: Age > 60 years    Negative: Patient wants to be seen    Abdominal pain is a chronic symptom (recurrent or ongoing AND lasting > 4 weeks)    Answer Assessment - Initial Assessment Questions  1. LOCATION: \"Where does it hurt?\"       Upper(2-3 in above belly button), radiates down to lower right wuadrant    2. RADIATION: \"Does the pain shoot anywhere else?\" (e.g., chest, back)      Radiates to righ lower  3. ONSET: \"When did the pain begin?\" (e.g., minutes, hours or days ago)       5 years ago started    4. SUDDEN: \"Gradual or sudden onset?\"      Gradual and sporatic    5. PATTERN \"Does the pain come and go, or is it constant?\"     - If constant: \"Is it getting better, staying the same, or worsening?\"       (Note: Constant means the pain never goes away completely; most serious pain is constant and it progresses)      - If intermittent: \"How long does it last?\" \"Do you have pain now?\"      (Note: Intermittent means the pain goes away completely between bouts)      Comes and goes    6. SEVERITY: \"How bad is the pain?\"  (e.g., Scale 1-10; mild, moderate, or severe)    - MILD (1-3): doesn't interfere with normal activities, abdomen soft and not tender to touch     - MODERATE (4-7): interferes with normal activities or awakens from sleep, tender to touch     - SEVERE (8-10): excruciating pain, doubled over, unable to do any normal activities       Around 6/10 when present    7. RECURRENT SYMPTOM: \"Have you ever had this type of abdominal pain before?\" If so, ask: \"When was the last time?\" and \"What happened that time?\"       Been seen before    8. CAUSE: \"What do you think is causing the abdominal pain?\"      Unsure  Tried cutting out dairy and gluten with no improvement    9. RELIEVING/AGGRAVATING FACTORS: \"What makes it better or worse?\" (e.g., movement, antacids, bowel movement)      advil sometimes works    10. OTHER " "SYMPTOMS: \"Has there been any vomiting, diarrhea, constipation, or urine problems?\"        Diarrhea, accompanied with pain    11. PREGNANCY: \"Is there any chance you are pregnant?\" \"When was your last menstrual period?\"        No    Protocols used: ABDOMINAL PAIN - FEMALE-A-OH    Angel Castillo RN   [place] Triage    "

## 2019-08-19 PROBLEM — R10.84 ABDOMINAL PAIN, GENERALIZED: Status: ACTIVE | Noted: 2019-08-19

## 2019-08-19 RX ORDER — EPINEPHRINE 0.3 MG/.3ML
INJECTION SUBCUTANEOUS
Refills: 1 | COMMUNITY
Start: 2019-05-29 | End: 2023-06-08

## 2019-08-19 NOTE — TELEPHONE ENCOUNTER
Patient was triaged on 08/16/2019 - scheduled for 08/20/2019 with DEMIAN FOUNTAIN.   Closing encounter    Clementina Michel RN  [place] Triage

## 2019-08-19 NOTE — PROGRESS NOTES
SUBJECTIVE:   Radha Blanco is a 23 year old female who presents to clinic today for the following health issues:    ABDOMINAL   PAIN     Onset: on and off for about 5 years, worsening in the past year or year and a half.     Description:   Character: Sharp and stabbing   Location: 2-3 inches above belly button usually stays in the middle of the abdomen.   Radiation: sometimes goes down into the right lower abdomin.     Intensity: mild, severe    Progression of Symptoms:  sporadic and intermittent    Accompanying Signs & Symptoms:  Fever/Chills?: YES- Chills, and shaky   Gas/Bloating: YES- bloating   Nausea: no   Vomitting: no   Diarrhea?: YES  Constipation:YES  Dysuria or Hematuria: no    History:   Trauma: no   Previous similar pain: YES- on going for five years.    Previous tests done: ultrasound performed at [facility] in [facility]. Also seen in urgent care 7/6/2019.     Precipitating factors:   Does the pain change with:     Food: no      BM: no     Urination: no     Alleviating factors:  none    Therapies Tried and outcome: has tried changing diet for 1-2 weeks. Has tried adding fiber- not effective     LMP:  Patient's last menstrual period was 08/10/2019 (approximate).         Patient reports 5 years of abdominal pain that is localized above the bellybutton that radiates to the right lower quadrant.    She denies ever having any abdominal surgeries.  She occasionally has a sensation of chills but no measurable fever.  She reports feeling bloated.  She has had loose stool that is not watery in nature over the course of the last 2 to 3 weeks which is atypical for her.  She normally has a bowel movement every day that is formed and brown.  She denies any dark stools or mucus in her stools.  She reports that she did have an ultrasound done at [facility] in [place], unfortunately, the results are not available today.  She was not informed of results.  This was done in July 2018.  She was also seen at urgent care in  the [place] system and had blood work performed which was all unremarkable.  She denies any known family history of Crohn's or ulcerative colitis.  She denies any known family history of gluten sensitivity or celiac disease.  She has tried to correlate her symptoms with diet but has been unsuccessful in finding any correlation.    Problem list and histories reviewed & adjusted, as indicated.  Additional history: as documented    Patient Active Problem List   Diagnosis     Seasonal allergies     Abdominal pain, generalized     Past Surgical History:   Procedure Laterality Date     SURGICAL HISTORY OF -       oral surgery       Social History     Tobacco Use     Smoking status: Never Smoker     Smokeless tobacco: Never Used   Substance Use Topics     Alcohol use: No     Family History   Problem Relation Age of Onset     Diabetes Mother         gestational only     Asthma Mother      Hypertension Maternal Grandmother      Diabetes Maternal Grandmother      C.A.D. Maternal Grandfather 49        cardiac arrest      Hypertension Paternal Grandmother      Cancer Paternal Grandfather         lung, brain and liver - smoker     Cerebrovascular Disease Maternal Aunt 40        vertebrae occlusion- stroke     Asthma Paternal Uncle          Current Outpatient Medications   Medication Sig Dispense Refill     EPINEPHrine (EPIPEN/ADRENACLICK/OR ANY BX GENERIC EQUIV) 0.3 MG/0.3ML injection 2-pack USE AS DIRECTED TO INJECT 0.3 MG INTRAMUSCULARLY. MAY REPEAT PRN  1     Allergies   Allergen Reactions     Bees      Lip swelling and hives        Reviewed and updated as needed this visit by clinical staff  Tobacco  Allergies  Meds  Problems  Med Hx  Surg Hx  Fam Hx  Soc Hx        Reviewed and updated as needed this visit by Provider  Tobacco  Allergies  Meds  Problems  Med Hx  Surg Hx  Fam Hx         ROS:  Constitutional, HEENT, cardiovascular, pulmonary, GI, , musculoskeletal, neuro, skin, endocrine and psych systems are  "negative, except as otherwise noted.      OBJECTIVE:   /60 (BP Location: Right arm, Cuff Size: Adult Regular)   Pulse 89   Temp 98.2  F (36.8  C) (Oral)   Ht 1.651 m (5' 5\")   Wt 59.4 kg (131 lb)   LMP 08/10/2019 (Approximate)   SpO2 99%   Breastfeeding? No   BMI 21.80 kg/m   Body mass index is 21.8 kg/m .    GENERAL: healthy, alert and no distress  EYES: Eyes grossly normal to inspection, PERRL and conjunctivae and sclerae normal  RESP: lungs clear to auscultation - no rales, rhonchi or wheezes  ABDOMEN: soft, nontender, no hepatosplenomegaly, no masses and bowel sounds normal  MS: no gross musculoskeletal defects noted, no edema  SKIN: no suspicious lesions or rashes  NEURO: Normal strength and tone, mentation intact and speech normal  PSYCH: mentation appears normal, affect normal/bright    Diagnostic Test Results:  Results for orders placed or performed in visit on 08/20/19   TSH with free T4 reflex   Result Value Ref Range    TSH 0.48 0.40 - 4.00 mU/L   Lipase   Result Value Ref Range    Lipase 152 73 - 393 U/L   ESR: Erythrocyte sedimentation rate   Result Value Ref Range    Sed Rate 13 0 - 20 mm/h   CRP, inflammation   Result Value Ref Range    CRP Inflammation <2.9 0.0 - 8.0 mg/L   Anti Nuclear Sabrina IgG by IFA with Reflex   Result Value Ref Range    ELIZABETH interpretation Positive (A) NEG^Negative    ELIZABETH pattern 1 DENSE FINE SPECKLED     ELIZABETH titer 1 1:320    CBC with platelets and differential   Result Value Ref Range    WBC 6.3 4.0 - 11.0 10e9/L    RBC Count 4.69 3.8 - 5.2 10e12/L    Hemoglobin 13.4 11.7 - 15.7 g/dL    Hematocrit 40.3 35.0 - 47.0 %    MCV 86 78 - 100 fl    MCH 28.6 26.5 - 33.0 pg    MCHC 33.3 31.5 - 36.5 g/dL    RDW 13.5 10.0 - 15.0 %    Platelet Count 351 150 - 450 10e9/L    % Neutrophils 59.0 %    % Lymphocytes 31.6 %    % Monocytes 8.1 %    % Eosinophils 0.8 %    % Basophils 0.5 %    Absolute Neutrophil 3.7 1.6 - 8.3 10e9/L    Absolute Lymphocytes 2.0 0.8 - 5.3 10e9/L    " Absolute Monocytes 0.5 0.0 - 1.3 10e9/L    Absolute Eosinophils 0.1 0.0 - 0.7 10e9/L    Absolute Basophils 0.0 0.0 - 0.2 10e9/L    Diff Method Automated Method        ASSESSMENT/PLAN:   Radha was seen today for abdominal pain.    Diagnoses and all orders for this visit:    ELIZABETH positive, Abdominal pain, generalized, Loose stools  Unclear etiology.  Concern for autoimmune source due to positive ELIZABETH.  Referral to gastroenterology for further evaluation and work-up.  -     TSH with free T4 reflex  -     Enteric Bacteria and Virus Panel by YANETH Stool; Future  -     Lipase  -     ESR: Erythrocyte sedimentation rate  -     CRP, inflammation  -     Anti Nuclear Vanessa IgG by IFA with Reflex  -     Tissue transglutaminase vanessa IgA and IgG  -     CT Abdomen Pelvis w Contrast; Future  -     CBC with platelets and differential  -     GASTROENTEROLOGY ADULT REF CONSULT ONLY        Return in about 4 weeks (around 9/17/2019) for gastroenterology.      Juliet Clemons PA-C  [facility]

## 2019-08-20 ENCOUNTER — OFFICE VISIT (OUTPATIENT)
Dept: FAMILY MEDICINE | Facility: CLINIC | Age: 24
End: 2019-08-20
Payer: COMMERCIAL

## 2019-08-20 VITALS
BODY MASS INDEX: 21.83 KG/M2 | OXYGEN SATURATION: 99 % | DIASTOLIC BLOOD PRESSURE: 60 MMHG | WEIGHT: 131 LBS | HEIGHT: 65 IN | TEMPERATURE: 98.2 F | HEART RATE: 89 BPM | SYSTOLIC BLOOD PRESSURE: 108 MMHG

## 2019-08-20 DIAGNOSIS — R19.5 LOOSE STOOLS: ICD-10-CM

## 2019-08-20 DIAGNOSIS — R10.84 ABDOMINAL PAIN, GENERALIZED: ICD-10-CM

## 2019-08-20 DIAGNOSIS — R76.8 ANA POSITIVE: Primary | ICD-10-CM

## 2019-08-20 LAB
BASOPHILS # BLD AUTO: 0 10E9/L (ref 0–0.2)
BASOPHILS NFR BLD AUTO: 0.5 %
CRP SERPL-MCNC: <2.9 MG/L (ref 0–8)
DIFFERENTIAL METHOD BLD: NORMAL
EOSINOPHIL # BLD AUTO: 0.1 10E9/L (ref 0–0.7)
EOSINOPHIL NFR BLD AUTO: 0.8 %
ERYTHROCYTE [DISTWIDTH] IN BLOOD BY AUTOMATED COUNT: 13.5 % (ref 10–15)
ERYTHROCYTE [SEDIMENTATION RATE] IN BLOOD BY WESTERGREN METHOD: 13 MM/H (ref 0–20)
HCT VFR BLD AUTO: 40.3 % (ref 35–47)
HGB BLD-MCNC: 13.4 G/DL (ref 11.7–15.7)
LIPASE SERPL-CCNC: 152 U/L (ref 73–393)
LYMPHOCYTES # BLD AUTO: 2 10E9/L (ref 0.8–5.3)
LYMPHOCYTES NFR BLD AUTO: 31.6 %
MCH RBC QN AUTO: 28.6 PG (ref 26.5–33)
MCHC RBC AUTO-ENTMCNC: 33.3 G/DL (ref 31.5–36.5)
MCV RBC AUTO: 86 FL (ref 78–100)
MONOCYTES # BLD AUTO: 0.5 10E9/L (ref 0–1.3)
MONOCYTES NFR BLD AUTO: 8.1 %
NEUTROPHILS # BLD AUTO: 3.7 10E9/L (ref 1.6–8.3)
NEUTROPHILS NFR BLD AUTO: 59 %
PLATELET # BLD AUTO: 351 10E9/L (ref 150–450)
RBC # BLD AUTO: 4.69 10E12/L (ref 3.8–5.2)
WBC # BLD AUTO: 6.3 10E9/L (ref 4–11)

## 2019-08-20 PROCEDURE — 36415 COLL VENOUS BLD VENIPUNCTURE: CPT | Performed by: PHYSICIAN ASSISTANT

## 2019-08-20 PROCEDURE — 83516 IMMUNOASSAY NONANTIBODY: CPT | Performed by: PHYSICIAN ASSISTANT

## 2019-08-20 PROCEDURE — 83516 IMMUNOASSAY NONANTIBODY: CPT | Mod: 91 | Performed by: PHYSICIAN ASSISTANT

## 2019-08-20 PROCEDURE — 85652 RBC SED RATE AUTOMATED: CPT | Performed by: PHYSICIAN ASSISTANT

## 2019-08-20 PROCEDURE — 86039 ANTINUCLEAR ANTIBODIES (ANA): CPT | Performed by: PHYSICIAN ASSISTANT

## 2019-08-20 PROCEDURE — 85025 COMPLETE CBC W/AUTO DIFF WBC: CPT | Performed by: PHYSICIAN ASSISTANT

## 2019-08-20 PROCEDURE — 84443 ASSAY THYROID STIM HORMONE: CPT | Performed by: PHYSICIAN ASSISTANT

## 2019-08-20 PROCEDURE — 99214 OFFICE O/P EST MOD 30 MIN: CPT | Performed by: PHYSICIAN ASSISTANT

## 2019-08-20 PROCEDURE — 86038 ANTINUCLEAR ANTIBODIES: CPT | Performed by: PHYSICIAN ASSISTANT

## 2019-08-20 PROCEDURE — 83690 ASSAY OF LIPASE: CPT | Performed by: PHYSICIAN ASSISTANT

## 2019-08-20 PROCEDURE — 86140 C-REACTIVE PROTEIN: CPT | Performed by: PHYSICIAN ASSISTANT

## 2019-08-20 ASSESSMENT — MIFFLIN-ST. JEOR: SCORE: 1350.09

## 2019-08-21 DIAGNOSIS — R19.5 LOOSE STOOLS: ICD-10-CM

## 2019-08-21 DIAGNOSIS — R10.84 ABDOMINAL PAIN, GENERALIZED: ICD-10-CM

## 2019-08-21 LAB
ANA PAT SER IF-IMP: ABNORMAL
ANA SER QL IF: POSITIVE
ANA TITR SER IF: ABNORMAL {TITER}
C COLI+JEJUNI+LARI FUSA STL QL NAA+PROBE: NOT DETECTED
EC STX1 GENE STL QL NAA+PROBE: NOT DETECTED
EC STX2 GENE STL QL NAA+PROBE: NOT DETECTED
ENTERIC PATHOGEN COMMENT: NORMAL
NOROV GI+II ORF1-ORF2 JNC STL QL NAA+PR: NOT DETECTED
RVA NSP5 STL QL NAA+PROBE: NOT DETECTED
SALMONELLA SP RPOD STL QL NAA+PROBE: NOT DETECTED
SHIGELLA SP+EIEC IPAH STL QL NAA+PROBE: NOT DETECTED
TSH SERPL DL<=0.005 MIU/L-ACNC: 0.48 MU/L (ref 0.4–4)
V CHOL+PARA RFBL+TRKH+TNAA STL QL NAA+PR: NOT DETECTED
Y ENTERO RECN STL QL NAA+PROBE: NOT DETECTED

## 2019-08-21 PROCEDURE — 87338 HPYLORI STOOL AG IA: CPT | Performed by: PHYSICIAN ASSISTANT

## 2019-08-21 PROCEDURE — 87209 SMEAR COMPLEX STAIN: CPT | Performed by: PHYSICIAN ASSISTANT

## 2019-08-21 PROCEDURE — 87177 OVA AND PARASITES SMEARS: CPT | Performed by: PHYSICIAN ASSISTANT

## 2019-08-21 PROCEDURE — 87506 IADNA-DNA/RNA PROBE TQ 6-11: CPT | Performed by: PHYSICIAN ASSISTANT

## 2019-08-21 NOTE — RESULT ENCOUNTER NOTE
Triage: please call patient with results/recommendations and please fax all records/results to [facility].    Radha  I have reviewed your recent labs. Here are the results:    Your labs do show an abnormal antinuclear antibody which can be indicative of a connective tissue or autoimmune process.  I would like to have you see gastroenterology for further evaluation.  Please call [facility] [phone] to schedule.         If you have any questions please do not hesitate to contact our office via phone ([phone]) or [email or web address].    Juliet Clemons, MS, PA-C  [facility] [place]

## 2019-08-22 LAB
H PYLORI AG STL QL IA: NEGATIVE
O+P STL MICRO: NORMAL
O+P STL MICRO: NORMAL
SPECIMEN SOURCE: NORMAL
TTG IGA SER-ACNC: <1 U/ML
TTG IGG SER-ACNC: 1 U/ML

## 2019-08-22 NOTE — RESULT ENCOUNTER NOTE
Radha  Here are your recent results.  No evidence for an infection being the cause of your loose stools.  Follow up with GI as planned.  If you have any questions please do not hesitate to contact our office via phone ([phone]) or MyChart.    Juliet Clemons, MS, PA-C  [facility] [place]

## 2019-09-29 ENCOUNTER — HEALTH MAINTENANCE LETTER (OUTPATIENT)
Age: 24
End: 2019-09-29

## 2019-10-11 ENCOUNTER — TRANSFERRED RECORDS (OUTPATIENT)
Dept: HEALTH INFORMATION MANAGEMENT | Facility: CLINIC | Age: 24
End: 2019-10-11

## 2021-01-14 ENCOUNTER — HEALTH MAINTENANCE LETTER (OUTPATIENT)
Age: 26
End: 2021-01-14

## 2021-10-24 ENCOUNTER — HEALTH MAINTENANCE LETTER (OUTPATIENT)
Age: 26
End: 2021-10-24

## 2022-02-13 ENCOUNTER — HEALTH MAINTENANCE LETTER (OUTPATIENT)
Age: 27
End: 2022-02-13

## 2022-10-15 ENCOUNTER — HEALTH MAINTENANCE LETTER (OUTPATIENT)
Age: 27
End: 2022-10-15

## 2022-11-04 ENCOUNTER — TRANSFERRED RECORDS (OUTPATIENT)
Dept: HEALTH INFORMATION MANAGEMENT | Facility: CLINIC | Age: 27
End: 2022-11-04

## 2022-11-07 ENCOUNTER — TRANSCRIBE ORDERS (OUTPATIENT)
Dept: OTHER | Age: 27
End: 2022-11-07

## 2022-11-07 DIAGNOSIS — H53.15 VISUAL DISTORTIONS OF SHAPE AND SIZE: ICD-10-CM

## 2022-11-07 DIAGNOSIS — H53.8 BLURRED VISION: ICD-10-CM

## 2022-11-07 DIAGNOSIS — H53.10 UNSPECIFIED SUBJECTIVE VISUAL DISTURBANCES: Primary | ICD-10-CM

## 2023-01-10 ENCOUNTER — OFFICE VISIT (OUTPATIENT)
Dept: OPHTHALMOLOGY | Facility: CLINIC | Age: 28
End: 2023-01-10
Attending: OPHTHALMOLOGY
Payer: COMMERCIAL

## 2023-01-10 DIAGNOSIS — H53.40 VISUAL FIELD DEFECT: Primary | ICD-10-CM

## 2023-01-10 DIAGNOSIS — G43.109 MIGRAINE AURA OCCURRING WITH AND WITHOUT HEADACHE: ICD-10-CM

## 2023-01-10 DIAGNOSIS — H35.89 ACUTE ZONAL OCCULT OUTER RETINOPATHY (AZOOR): Primary | ICD-10-CM

## 2023-01-10 DIAGNOSIS — H53.412 SCOTOMA INVOLVING CENTRAL AREA OF LEFT EYE: ICD-10-CM

## 2023-01-10 PROCEDURE — 99204 OFFICE O/P NEW MOD 45 MIN: CPT | Mod: GC | Performed by: OPHTHALMOLOGY

## 2023-01-10 PROCEDURE — G0463 HOSPITAL OUTPT CLINIC VISIT: HCPCS | Mod: 25

## 2023-01-10 PROCEDURE — 92133 CPTRZD OPH DX IMG PST SGM ON: CPT | Performed by: OPHTHALMOLOGY

## 2023-01-10 ASSESSMENT — REFRACTION_WEARINGRX
OS_CYLINDER: +0.50
OD_AXIS: 094
OS_AXIS: 088
OS_SPHERE: -9.00
OD_CYLINDER: +0.50
OD_SPHERE: -8.75

## 2023-01-10 ASSESSMENT — SLIT LAMP EXAM - LIDS
COMMENTS: NORMAL
COMMENTS: NORMAL

## 2023-01-10 ASSESSMENT — VISUAL ACUITY
OD_SC+: -1
OD_SC: 20/20
OS_SC: 20/20
METHOD: SNELLEN - LINEAR
CORRECTION_TYPE: CONTACTS

## 2023-01-10 ASSESSMENT — CUP TO DISC RATIO
OD_RATIO: 0.35
OS_RATIO: 0.35

## 2023-01-10 ASSESSMENT — EXTERNAL EXAM - LEFT EYE: OS_EXAM: NORMAL

## 2023-01-10 ASSESSMENT — CONF VISUAL FIELD
METHOD: COUNTING FINGERS
OD_INFERIOR_TEMPORAL_RESTRICTION: 0
OS_SUPERIOR_NASAL_RESTRICTION: 0
OS_SUPERIOR_TEMPORAL_RESTRICTION: 0
OS_INFERIOR_TEMPORAL_RESTRICTION: 0
OS_INFERIOR_NASAL_RESTRICTION: 0
OD_SUPERIOR_TEMPORAL_RESTRICTION: 0
OS_NORMAL: 1
OD_INFERIOR_NASAL_RESTRICTION: 0
OD_NORMAL: 1
OD_SUPERIOR_NASAL_RESTRICTION: 0

## 2023-01-10 ASSESSMENT — EXTERNAL EXAM - RIGHT EYE: OD_EXAM: NORMAL

## 2023-01-10 ASSESSMENT — TONOMETRY
OD_IOP_MMHG: 17
IOP_METHOD: TONOPEN
OS_IOP_MMHG: 18

## 2023-01-10 NOTE — LETTER
"1/10/2023         RE:  :  MRN: Radha Blanco  1995  [id number]     Dear Dr. Adam:     Thank you for asking me to see your very pleasant patient, Radha Blanco, in neuro-ophthalmic consultation.  I would like to thank you for sending your records and I have summarized them in the history of present illness.   My assessment and plan are below.  For further details, please see my attached clinic note.      Radha Blanco is a 27 year old female with the following diagnoses:   1. 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 Tong Adam ([facility]) for visual disturbances in her left eye.     HPI: Pt has and unremarkable PMHx/POHx who noticed diagonal gaps in her vision in the left eye around early Oct 2022.     Patient notes she initially noticed a change in her vision late September/early October.  She first noticed her left eye was blurry and spotty when looking at the blackboard.  Blurriness gradually worsened over the course of a week.  This has persisted since onset, but waxes and wanes.  Overall, she describes it as a difficulty focusing at both distance and near that progresses to a \"black holes a 2 o'clock and 9 o'clock.\"    Prior to this, in 2022, she noticed a black spot in her right eye.  This was not associated with a headache and lasted 30 min to an hour.  She experienced a similar episode in 2022, but this time noted sparkles along the black spot.  This was followed by a headache behind the left eye (she notes her headaches often start behind the right or left eye and tend to radiate back).  Eye movement makes her pain worse.  She notes that back in elementary/middle school she had some headaches that were not as intense, but began behind her right eye and worsened with movement.  She denies any vision changes at the time.    She has not been diagnosed with migraines, but reports she has a " longstanding history of headaches that have increased in severity and frequency (normally a few times yearly in high school/college).  She has been experiencing weekly headaches for 1-1.5 years.  These last hours at a time (noon to bedtime).  Initially Advil and rest were enough to relieve discomfort.  Now she reports she takes Excedrin which relieves some of her pain.  Monthly she has episodes that are associated with nausea, light, and sound sensitivity.  These are more intense and result in the patient going home and resting with lights off.  She is unsure about migraine history, but reports her paternal grandmother gets bad headaches.    She was seen by Dr. Matos at [facility] who ruled out and RD and referred her to Dr. Adam.  Dr. Adam saw the patient on 11/4/22 and could not find a retinal or other funduscopic cause of these symptoms. There was slight VMA each eye and RPE mottling in the left eye; FAF did not show any abnormalities. Acuity was 20/20 each eye.    She notes that she has had a single episode of tingling sensation in her hands.  Her headaches make her limbs feel week.  She has had some episodes of monocular double vision when reading or with nighttime driving.  Patient reports a normal, healthy diet throughout her life.  Denies exposure to chemicals or heavy metals or head trauma.    Independent historians: Patient and chart review    Review of outside testing: Non available    My interpretation performed today of outside testing: Not applicable     Review of outside clinical notes: - Visit with Dr. Tong Adam 11/04/2022    Past medical history:  Patient Active Problem List   Diagnosis     Seasonal allergies     Abdominal pain, generalized     Medications:  None Rx    Family history / social history: Patient's family history includes Asthma in her mother and paternal uncle; C.A.D. (age of onset: 49) in her maternal grandfather; Cancer in her paternal grandfather; Cerebrovascular Disease  (age of onset: 40) in her maternal aunt; Diabetes in her maternal grandmother and mother; Hypertension in her maternal grandmother and paternal grandmother.     Patient  reports that she has never smoked. She has never used smokeless tobacco. She reports that she does not drink alcohol and does not use drugs.     Exam:Uncorrected distance visual acuity was 20/20 -1 in the right eye and 20/20 in the left eye. Intraocular pressure was 17 in the right eye and 18 in the left eye using Tonopen.  Color vision 11/11 right eye and 11/11 left eye.  Pupils were round, reactive, with no APD.  Anterior segment exam and fundus exam were largely unremarkable.    Sensorimotor exam showed full motility in both eyes in all gaze positions. Alignment was orthophoric in all cardinal gaze positions.     Tests ordered and interpreted today:   GTop:  Right eye: Reliable.  Mean deviation -0.8 dB.  Defect superior to fixation.  Left eye: Reliable.  Mean deviation -0.5 dB.  Full field.    OCT RNFL:  Right eye: Average RNFL 77 microns.  Mild nasal thinning.  Left eye: Average RNFL 75 microns. Mild nasal thinning.  Inferonasal disruption of the RPE.          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  She describes an area of absolute scotoma in her left eye only.  On Amsler, she reports a small, paracentral scotoma in the left eye.  On OCT, some of the Raster's through the inferoonasal paracentral macula show a defect in the RPE. Acute zonal occult outer retinopathy is a condition that typically affects one eye and causes a visual field defect typically attached to the blindspot.  The patient often notices photopsias.  Patients are often young women. The diagnosis is often based on loss of the ellipsoid, fundus autofluorescence abnormalities, or multifocal electroretinogram depressions.  There is no  treatment for this condition and the patients typically enjoy stable or improved visual function.  Rarely, vision loss can be progressive over months.     She has some mild RNFL thinning on OCT that is most likely due to her high myopia especially given the symmetry of her nerve fiber layer.  I did not schedule her to follow-up today, but I am happy to see her back if she notices any changes in her vision.    She is likely experiencing migraine aura in her right eye. Due to increasing intensity and frequency of her headaches over the last 2 years, I would recommend evaluation by a headache specialist and advised the patient to continue to monitor her triggers.    Again, thank you for allowing me to participate in the care of your patient.      Sincerely,    Chito Conteh MD  Professor  Ophthalmology Residency   Director of Neuro-Ophthalmology  [place] - Scheie Endow Chair  Departments of Ophthalmology, Neurology, and Neurosurgery  [facility] [address] [place] [address]  [address]  T - [phone]  F - [phone]  DONIS [email or web address]      CC: Juliet Clemons PA-C  [address] [facility] [address]  Via In Basket     Tong Adam MD  Vitreoretinal Surgery  [address]  [facility] [address]  Via In Basket    DX = acute zonal occult outer retinopathy

## 2023-01-10 NOTE — NURSING NOTE
Chief Complaints and History of Present Illnesses   Patient presents with     New Patient     Chief Complaint(s) and History of Present Illness(es)     New Patient            Laterality: both eyes    Associated symptoms: glare, haloes, double vision (at night when driving sometimes.), eye pain, photophobia, flashes and floaters    Treatments tried: no treatments    Pain scale: 10/10          Comments    Pt states her LE has been blurry spots and missing spots in her vision since the end of September 2022.  Pt notes that there are days it is more noticeable then others.  Her RE has a black spot with blinking triangles.  Pt has had weekly headaches behind the back of her eyes for about a year and a half.  The frequency and intensity has increased.     SENTHIL STOLL COA January 10, 2023 7:53 AM

## 2023-01-10 NOTE — Clinical Note
"1/10/2023       RE: Radha Blanco  [address]  [place] [id number]     Dear Colleague,    Thank you for referring your patient, Radha Blanco, to the [facility] [place] at [facility]. Please see a copy of my visit note below.        Radha Blanco is a 27 year old female with the following diagnoses:   1. Acute zonal occult outer retinopathy (AZOOR)    2. 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 Tong Adam ([facility]) for visual disturbances in her left eye.     HPI: Pt has and unremarkable PMHx/POHx who noticed diagonal gaps in her vision in the left eye around early Oct 2022.     Patient notes she initially noticed a change in her vision late September/early October.  She first noticed her left eye was blurry and spotty when looking at the blackboard.  Blurriness gradually worsened over the course of a week.  This has persisted since onset, but waxes and wanes.  Overall, she describes it as a difficulty focusing at both distance and near that progresses to a \"black holes a 2 o'clock and 9 o'clock.\"    Prior to this, in March 2022, she noticed a black spot in her right eye.  This was not associated with a headache and lasted 30 min to an hour.  She experienced a similar episode in October 2022, but this time noted sparkles along the black spot.  This was followed by a headache behind the left eye (she notes her headaches often start behind the right or left eye and tend to radiate back).  Eye movement makes her pain worse.  She notes that back in elementary/middle school she had some headaches that were not as intense, but began behind her right eye and worsened with movement.  She denies any vision changes at the time.    She has not been diagnosed with migraines, but reports she has a longstanding history of headaches that have increased in severity and frequency (normally a few times yearly " in high school/college).  She has been experiencing weekly headaches for 1-1.5 years.  These last hours at a time (noon to bedtime).  Initially Advil and rest were enough to relieve discomfort.  Now she reports she takes Excedrin which relieves some of her pain.  Monthly she has episodes that are associated with nausea, light, and sound sensitivity.  These are more intense and result in the patient going home and resting with lights off.  She is unsure about migraine history, but reports her paternal grandmother gets bad headaches.    She was seen by Dr. Matos at [facility] who ruled out and RD and referred her to Dr. Adam.  Dr. Adam saw the patient on 11/4/22 and could not find a retinal or other funduscopic cause of these symptoms. There was slight VMA each eye and RPE mottling in the left eye; FAF did not show any abnormalities. Acuity was 20/20 each eye.    She notes that she has had a single episode of tingling sensation in her hands.  Her headaches make her limbs feel week.  She has had some episodes of monocular double vision when reading or with nighttime driving.  Patient reports a normal, healthy diet throughout her life.  Denies exposure to chemicals or heavy metals or head trauma.    Independent historians: Patient and chart review    Review of outside testing:  Non available    My interpretation performed today of outside testing:  Not applicable     Review of outside clinical notes:  - Visit with Dr. Tong Adam 11/04/2022    Past medical history:  Patient Active Problem List   Diagnosis     Seasonal allergies     Abdominal pain, generalized       Medications:   None Rx    Family history / social history:  Patient's family history includes Asthma in her mother and paternal uncle; C.A.D. (age of onset: 49) in her maternal grandfather; Cancer in her paternal grandfather; Cerebrovascular Disease (age of onset: 40) in her maternal aunt; Diabetes in her maternal grandmother and mother; Hypertension  in her maternal grandmother and paternal grandmother.     Patient  reports that she has never smoked. She has never used smokeless tobacco. She reports that she does not drink alcohol and does not use drugs.       Exam:  Uncorrected distance visual acuity was 20/20 -1 in the right eye and 20/20 in the left eye. Intraocular pressure was 17 in the right eye and 18 in the left eye using Tonopen.  Color vision 11/11 right eye and 11/11 left eye.  Pupils were round, reactive, with no APD.  Anterior segment exam and fundus exam were largely unremarkable.    Sensorimotor exam showed full motility in both eyes in all gaze positions. Alignment was orthophoric in all cardinal gaze positions.     Tests ordered and interpreted today:   GTop:  Right eye: Reliable.  Mean deviation -0.8 dB.  Defect superior to fixation.  Left eye: Reliable.  Mean deviation -0.5 dB.  Full field.    OCT RNFL:  Right eye: Average RNFL 77 microns.  Mild nasal thinning.  Left eye: Average RNFL 75 microns. Mild nasal thinning.  Inferonasal disruption of the RPE.          Discussion of management / interpretation with another provider:  None    Assessment/Plan:   It is my impression that patient has acute zonal occult outer retinopathy of the LEFT eye.  She describes an area of absolute scotoma in her left eye only.  On Amsler, she reports a small, paracentral scotoma in the left eye.  On OCT, some of the Raster's through the inferoonasal paracentral macula show a defect in the RPE. Acute zonal occult outer retinopathy is a condition that typically affects one eye and causes a visual field defect typically attached to the blindspot.  The patient often notices photopsias.  Patients are often young women. The diagnosis is often based on loss of the ellipsoid, fundus autofluorescence abnormalities, or multifocal electroretinogram depressions.  There is no treatment for this condition and the patients typically enjoy stable or improved visual function.   Rarely, vision loss can be progressive over months.       She has some mild RNFL thinning on OCT that is most likely due to her high myopia especially given the symmetry of her nerve fiber layer.  I did not schedule her to follow-up today, but I am happy to see her back if she notices any changes in her vision.    She is likely experiencing migraine aura in her right eye. Due to increasing intensity and frequency of her headaches over the last 2 years, I would recommend evaluation by a headache specialist and advised the patient to continue to monitor her triggers.           Attending Physician Attestation:  Complete documentation of historical and exam elements from today's encounter can be found in the full encounter summary report (not reduplicated in this progress note).  I personally obtained the chief complaint(s) and history of present illness.  I confirmed and edited as necessary the review of systems, past medical/surgical history, family history, social history, and examination findings as documented by others; and I examined the patient myself.  I personally reviewed the relevant tests, images, and reports as documented above.  I formulated and edited as necessary the assessment and plan and discussed the findings and management plan with the patient and family. I personally reviewed the ophthalmic test(s) associated with this encounter, agree with the interpretation(s) as documented by the resident/fellow, and have edited the corresponding report(s) as necessary.  - Chito Conteh MD        Precharting:  Yosi Mcgraw MD  Ophthalmology Resident PGY3    Carito Walker, OD      Again, thank you for allowing me to participate in the care of your patient.      Sincerely,    Chito Conteh MD

## 2023-01-10 NOTE — PROGRESS NOTES
"     Radha Blanco is a 27 year old female with the following diagnoses:   1. Acute zonal occult outer retinopathy (AZOOR)    2. 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 Tong Adam ([facility]) for visual disturbances in her left eye.     HPI: Pt has and unremarkable PMHx/POHx who noticed diagonal gaps in her vision in the left eye around early Oct 2022.     Patient notes she initially noticed a change in her vision late September/early October.  She first noticed her left eye was blurry and spotty when looking at the blackboard.  Blurriness gradually worsened over the course of a week.  This has persisted since onset, but waxes and wanes.  Overall, she describes it as a difficulty focusing at both distance and near that progresses to a \"black holes a 2 o'clock and 9 o'clock.\"    Prior to this, in March 2022, she noticed a black spot in her right eye.  This was not associated with a headache and lasted 30 min to an hour.  She experienced a similar episode in October 2022, but this time noted sparkles along the black spot.  This was followed by a headache behind the left eye (she notes her headaches often start behind the right or left eye and tend to radiate back).  Eye movement makes her pain worse.  She notes that back in elementary/middle school she had some headaches that were not as intense, but began behind her right eye and worsened with movement.  She denies any vision changes at the time.    She has not been diagnosed with migraines, but reports she has a longstanding history of headaches that have increased in severity and frequency (normally a few times yearly in high school/college).  She has been experiencing weekly headaches for 1-1.5 years.  These last hours at a time (noon to bedtime).  Initially Advil and rest were enough to relieve discomfort.  Now she reports she takes Excedrin which relieves some of her pain.  Monthly she has episodes that are " associated with nausea, light, and sound sensitivity.  These are more intense and result in the patient going home and resting with lights off.  She is unsure about migraine history, but reports her paternal grandmother gets bad headaches.    She was seen by Dr. Matos at [facility] who ruled out and RD and referred her to Dr. Adam.  Dr. Adam saw the patient on 11/4/22 and could not find a retinal or other funduscopic cause of these symptoms. There was slight VMA each eye and RPE mottling in the left eye; FAF did not show any abnormalities. Acuity was 20/20 each eye.    She notes that she has had a single episode of tingling sensation in her hands.  Her headaches make her limbs feel week.  She has had some episodes of monocular double vision when reading or with nighttime driving.  Patient reports a normal, healthy diet throughout her life.  Denies exposure to chemicals or heavy metals or head trauma.    Independent historians: Patient and chart review    Review of outside testing:  Non available    My interpretation performed today of outside testing:  Not applicable     Review of outside clinical notes:  - Visit with Dr. Tong Adam 11/04/2022    Past medical history:  Patient Active Problem List   Diagnosis     Seasonal allergies     Abdominal pain, generalized       Medications:   None Rx    Family history / social history:  Patient's family history includes Asthma in her mother and paternal uncle; C.A.D. (age of onset: 49) in her maternal grandfather; Cancer in her paternal grandfather; Cerebrovascular Disease (age of onset: 40) in her maternal aunt; Diabetes in her maternal grandmother and mother; Hypertension in her maternal grandmother and paternal grandmother.     Patient  reports that she has never smoked. She has never used smokeless tobacco. She reports that she does not drink alcohol and does not use drugs.       Exam:  Uncorrected distance visual acuity was 20/20 -1 in the right eye and 20/20 in  the left eye. Intraocular pressure was 17 in the right eye and 18 in the left eye using Tonopen.  Color vision 11/11 right eye and 11/11 left eye.  Pupils were round, reactive, with no APD.  Anterior segment exam and fundus exam were largely unremarkable.    Sensorimotor exam showed full motility in both eyes in all gaze positions. Alignment was orthophoric in all cardinal gaze positions.     Tests ordered and interpreted today:   GTop:  Right eye: Reliable.  Mean deviation -0.8 dB.  Defect superior to fixation.  Left eye: Reliable.  Mean deviation -0.5 dB.  Full field.    OCT RNFL:  Right eye: Average RNFL 77 microns.  Mild nasal thinning.  Left eye: Average RNFL 75 microns. Mild nasal thinning.  Inferonasal disruption of the RPE.          Discussion of management / interpretation with another provider:  None    Assessment/Plan:   It is my impression that patient has acute zonal occult outer retinopathy of the LEFT eye.  She describes an area of absolute scotoma in her left eye only.  On Amsler, she reports a small, paracentral scotoma in the left eye.  On OCT, some of the Raster's through the inferoonasal paracentral macula show a defect in the RPE. Acute zonal occult outer retinopathy is a condition that typically affects one eye and causes a visual field defect typically attached to the blindspot.  The patient often notices photopsias.  Patients are often young women. The diagnosis is often based on loss of the ellipsoid, fundus autofluorescence abnormalities, or multifocal electroretinogram depressions.  There is no treatment for this condition and the patients typically enjoy stable or improved visual function.  Rarely, vision loss can be progressive over months.       She has some mild RNFL thinning on OCT that is most likely due to her high myopia especially given the symmetry of her nerve fiber layer.  I did not schedule her to follow-up today, but I am happy to see her back if she notices any changes in  her vision.    She is likely experiencing migraine aura in her right eye. Due to increasing intensity and frequency of her headaches over the last 2 years, I would recommend evaluation by a headache specialist and advised the patient to continue to monitor her triggers.            Attending Physician Attestation:  Complete documentation of historical and exam elements from today's encounter can be found in the full encounter summary report (not reduplicated in this progress note).  I personally obtained the chief complaint(s) and history of present illness.  I confirmed and edited as necessary the review of systems, past medical/surgical history, family history, social history, and examination findings as documented by others; and I examined the patient myself.  I personally reviewed the relevant tests, images, and reports as documented above.  I formulated and edited as necessary the assessment and plan and discussed the findings and management plan with the patient and family. I personally reviewed the ophthalmic test(s) associated with this encounter, agree with the interpretation(s) as documented by the resident/fellow, and have edited the corresponding report(s) as necessary.  - Chito Conteh MD        Precharting:  Yosi Mcgraw MD  Ophthalmology Resident PGY3    Carito Walker, OD

## 2023-02-01 ENCOUNTER — MYC MEDICAL ADVICE (OUTPATIENT)
Dept: OPHTHALMOLOGY | Facility: CLINIC | Age: 28
End: 2023-02-01
Payer: COMMERCIAL

## 2023-02-02 NOTE — TELEPHONE ENCOUNTER
Spoke to pt and pt will be seeing regular retina provider next week in [place].    Pt's states vision not contining to worsen and aware to contact clinic for any acute vision changes/symptoms    Provided [phone] option 4 for information if would occur over weekend.    Enoch Baires RN 1:12 PM 02/02/23

## 2023-02-08 ENCOUNTER — TRANSFERRED RECORDS (OUTPATIENT)
Dept: HEALTH INFORMATION MANAGEMENT | Facility: CLINIC | Age: 28
End: 2023-02-08
Payer: COMMERCIAL

## 2023-02-14 ENCOUNTER — TELEPHONE (OUTPATIENT)
Dept: OPHTHALMOLOGY | Facility: CLINIC | Age: 28
End: 2023-02-14
Payer: COMMERCIAL

## 2023-03-12 NOTE — TELEPHONE ENCOUNTER
RECORDS RECEIVED FROM:    REASON FOR VISIT: Migraines    Date of Appt: 6/1/2023   NOTES (FOR ALL VISITS) STATUS DETAILS   OFFICE NOTE from referring provider Marco A Conteh-1/10/2023   OFFICE NOTE from other specialist     DISCHARGE SUMMARY from hospital     DISCHARGE REPORT from the ER     OPERATIVE REPORT     [facility] ([facility] ONLY)     EMG     EEG     MEDICATION LIST     IMAGING  (FOR ALL VISITS)     LUMBAR PUNCTURE     FLASH SCAN     ULTRASOUND (CAROTID BILAT) *VASCULAR*     MRI (HEAD, NECK, SPINE) No Images     CT (HEAD, NECK, SPINE) No Images

## 2023-03-26 ENCOUNTER — HEALTH MAINTENANCE LETTER (OUTPATIENT)
Age: 28
End: 2023-03-26

## 2023-05-30 ASSESSMENT — HEADACHE IMPACT TEST (HIT 6)
HOW OFTEN HAVE YOU FELT TOO TIRED TO WORK BECAUSE OF YOUR HEADACHES: VERY OFTEN
WHEN YOU HAVE A HEADACHE HOW OFTEN DO YOU WISH YOU COULD LIE DOWN: ALWAYS
HOW OFTEN DID HEADACHS LIMIT CONCENTRATION ON WORK OR DAILY ACTIVITY: SOMETIMES
HOW OFTEN HAVE YOU FELT FED UP OR IRRITATED BECAUSE OF YOUR HEADACHES: VERY OFTEN
HIT6 TOTAL SCORE: 66
WHEN YOU HAVE HEADACHES HOW OFTEN IS THE PAIN SEVERE: VERY OFTEN
HOW OFTEN DO HEADACHES LIMIT YOUR DAILY ACTIVITIES: SOMETIMES

## 2023-05-30 ASSESSMENT — MIGRAINE DISABILITY ASSESSMENT (MIDAS)
HOW MANY DAYS IN THE PAST 3 MONTHS HAVE YOU HAD A HEADACHE: 22
HOW MANY DAYS WAS HOUSEWORK PRODUCTIVITY CUT IN HALF DUE TO HEADACHES: 10
TOTAL SCORE: 39
HOW MANY DAYS DID YOU NOT DO HOUSEWORK BECAUSE OF HEADACHES: 0
HOW MANY DAYS DID YOU MISS WORK OR SCHOOL BECAUSE OF HEADACHES: 1
HOW OFTEN WERE SOCIAL ACTIVITIES MISSED DUE TO HEADACHES: 11
HOW MANY DAYS WAS YOUR PRODUCTIVITY CUT IN HALF BECAUSE OF HEADACHES: 17
ON A SCALE FROM 0-10 ON AVERAGE HOW PAINFUL WERE HEADACHES: 8

## 2023-06-01 ENCOUNTER — VIRTUAL VISIT (OUTPATIENT)
Dept: NEUROLOGY | Facility: CLINIC | Age: 28
End: 2023-06-01
Attending: OPHTHALMOLOGY
Payer: COMMERCIAL

## 2023-06-01 ENCOUNTER — PRE VISIT (OUTPATIENT)
Dept: NEUROLOGY | Facility: CLINIC | Age: 28
End: 2023-06-01

## 2023-06-01 ENCOUNTER — TELEPHONE (OUTPATIENT)
Dept: NEUROLOGY | Facility: CLINIC | Age: 28
End: 2023-06-01

## 2023-06-01 DIAGNOSIS — R51.9 WORSENING HEADACHES: ICD-10-CM

## 2023-06-01 DIAGNOSIS — G43.109 MIGRAINE AURA OCCURRING WITH AND WITHOUT HEADACHE: ICD-10-CM

## 2023-06-01 DIAGNOSIS — G43.109 MIGRAINE WITH AURA AND WITHOUT STATUS MIGRAINOSUS, NOT INTRACTABLE: Primary | ICD-10-CM

## 2023-06-01 PROCEDURE — 99203 OFFICE O/P NEW LOW 30 MIN: CPT | Mod: VID | Performed by: NURSE PRACTITIONER

## 2023-06-01 RX ORDER — RIZATRIPTAN BENZOATE 5 MG/1
5-10 TABLET, ORALLY DISINTEGRATING ORAL
Qty: 18 TABLET | Refills: 6 | Status: SHIPPED | OUTPATIENT
Start: 2023-06-01

## 2023-06-01 RX ORDER — NORTRIPTYLINE HCL 10 MG
10 CAPSULE ORAL AT BEDTIME
Qty: 30 CAPSULE | Refills: 6 | Status: SHIPPED | OUTPATIENT
Start: 2023-06-01 | End: 2024-03-13

## 2023-06-01 NOTE — PROGRESS NOTES
"Radha is a 27 year old who is being evaluated via a billable video visit.      ASSESSMENT AND PLAN:  Migraine with aura based on the symptoms provided. Possible genetic in etiology  I would obtain a baseline brain MRI because headaches changed in pattern and getting worse over past 2 years for any structural abnormalities. No images in the past.   Would suggest migraine prevention and rescue treatment.   A trial of nortriptyline 10 mg at bedtime for migraine prevention. May cause drowsiness or insomnia, dryness, constipation. Aware of decreasing or increasing response to epi pen-did not use it in the past 15 years.   Rescue treatment -a trial of rizatriptan as needed at severe headache onset. Side effects-nausea, fatigue, drowsiness, stop if any chest pain.   Excedrin as needed but limit use to no more than 14 days per month   Follow up in 3-4 months or sooner if needed       Subjective   Radha is a 27 year old, presenting for the following health issues:  Consult    HPI   Headaches worsening in the past 2 years-more frequent and severe. Headaches since childhood. No headaches thru college. About 4 years ago headache behind right  eye stabbing when in sunlight or heat -infrequent. Bright sun light trigger headaches. In the past 6 month strenuous exercise trigger headaches.   Mostly stabbing behind right eye but in the past 2 years behind left eye. Sometimes feels nauseous, fatigued, progressively worse as the day progresses. When gets a headache than out for day  Affect daily activity.     Fr -22 in the past 3 months and duration -6+hours. Usually start around noon and progresses to more severe pain.     Visual aura-scotoma right eye  -twice in the past year and duration an hour. 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 Did not use epi pen for 15 years         Objective    Vitals - Patient Reported  Weight (Patient Reported): 54.4 kg (120 lb)  Height (Patient Reported): 162.6 cm (5' 4\")  BMI (Based on Pt Reported Ht/Wt): 20.6  Pain Score: No Pain (0)        Physical Exam   GENERAL: Healthy, alert and no distress  EYES: Eyes grossly normal to inspection.  RESP: No audible wheeze, cough, or visible cyanosis.  No visible retractions or increased work of breathing.    SKIN: Visible skin clear. NEURO: face is symmetrical and symmetrical facial expressions, no weakness  Mentation and speech appropriate for age.  PSYCH: Mentation appears normal, affect normal/bright, judgement and insight intact, normal speech and appearance well-groomed.    I discussed all my recommendations with Radha Blanco who verbalizes understanding and comfortable with the plan.     39 minutes spent on the date of the encounter doing video access, chart  review, results review,  meds review, treatment plan, documentation and further activities as noted above    GLENN Guillen, CNP [facility]  Headache certified  [facility]    Video-Visit Details    Type of service:  Video Visit   Originating Location (pt. Location): Home  Distant Location (provider location):  Off-site  Platform used for Video Visit: Caterina"

## 2023-06-01 NOTE — NURSING NOTE
Is the patient currently in the state of [place]? YES    Visit mode:VIDEO    If the visit is dropped, the patient can be reconnected by: VIDEO VISIT: Text to cell phone: [phone]    Will anyone else be joining the visit? NO      How would you like to obtain your AVS? MyChart    Are changes needed to the allergy or medication list? NO    Reason for visit: Consult

## 2023-06-01 NOTE — PATIENT INSTRUCTIONS
I would obtain a baseline brain MRI because headaches changed in pattern and getting worse over past 2 years for any structural abnormalities. No images in the past.   Would suggest migraine prevention and rescue treatment.   A trial of nortriptyline 10 mg at bedtime for migraine prevention. May cause drowsiness or insomnia, dryness, constipation. Aware of decreasing or increasing response to epi pen-did not use it in the past 15 years.   Rescue treatment -a trial of rizatriptan as needed at severe headache onset. Side effects-nausea, fatigue, drowsiness, stop if any chest pain.   Excedrin as needed but limit use to no more than 14 days per month   Follow up in 3-4 months or sooner if needed

## 2023-06-01 NOTE — LETTER
6/1/2023       RE: Radha Blanco  [address] [address]     Dear Colleague,    Thank you for referring your patient, Radha Blanco, to the [facility] [place] [facility]. Please see a copy of my visit note below.    Radha is a 27 year old who is being evaluated via a billable video visit.      ASSESSMENT AND PLAN:  Migraine with aura based on the symptoms provided. Possible genetic in etiology  I would obtain a baseline brain MRI because headaches changed in pattern and getting worse over past 2 years for any structural abnormalities. No images in the past.   Would suggest migraine prevention and rescue treatment.   A trial of nortriptyline 10 mg at bedtime for migraine prevention. May cause drowsiness or insomnia, dryness, constipation. Aware of decreasing or increasing response to epi pen-did not use it in the past 15 years.   Rescue treatment -a trial of rizatriptan as needed at severe headache onset. Side effects-nausea, fatigue, drowsiness, stop if any chest pain.   Excedrin as needed but limit use to no more than 14 days per month   Follow up in 3-4 months or sooner if needed       Subjective   Radha is a 27 year old, presenting for the following health issues:  Consult    HPI   Headaches worsening in the past 2 years-more frequent and severe. Headaches since childhood. No headaches thru college. About 4 years ago headache behind right  eye stabbing when in sunlight or heat -infrequent. Bright sun light trigger headaches. In the past 6 month strenuous exercise trigger headaches.   Mostly stabbing behind right eye but in the past 2 years behind left eye. Sometimes feels nauseous, fatigued, progressively worse as the day progresses. When gets a headache than out for day  Affect daily activity.     Fr -22 in the past 3 months and duration -6+hours. Usually start around noon and progresses to more severe pain.  "    Visual aura-scotoma right eye  -twice in the past year and duration an hour. 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 Did not use epi pen for 15 years        Objective    Vitals - Patient Reported  Weight (Patient Reported): 54.4 kg (120 lb)  Height (Patient Reported): 162.6 cm (5' 4\")  BMI (Based on Pt Reported Ht/Wt): 20.6  Pain Score: No Pain (0)        Physical Exam   GENERAL: Healthy, alert and no distress  EYES: Eyes grossly normal to inspection.  RESP: No audible wheeze, cough, or visible cyanosis.  No visible retractions or increased work of breathing.    SKIN: Visible skin clear. NEURO: face is symmetrical and symmetrical facial expressions, no weakness  Mentation and speech appropriate for age.  PSYCH: Mentation appears normal, affect normal/bright, judgement and insight intact, normal speech and appearance well-groomed.    I discussed all my recommendations with Radha Blanco who verbalizes understanding and comfortable with the plan.     39 minutes spent on the date of the encounter doing video access, chart  review, results review,  meds review, treatment plan, documentation and further activities as noted above    GLENN Guillen, CNP [facility]  Headache certified  [facility]    Video-Visit Details    Type of service:  Video Visit   Originating Location (pt. Location): Home  Distant Location (provider location):  Off-site  Platform used for Video Visit: Caterina"

## 2023-06-08 ENCOUNTER — OFFICE VISIT (OUTPATIENT)
Dept: FAMILY MEDICINE | Facility: CLINIC | Age: 28
End: 2023-06-08
Payer: COMMERCIAL

## 2023-06-08 VITALS
OXYGEN SATURATION: 100 % | DIASTOLIC BLOOD PRESSURE: 60 MMHG | BODY MASS INDEX: 20.06 KG/M2 | TEMPERATURE: 97.7 F | RESPIRATION RATE: 16 BRPM | HEIGHT: 65 IN | HEART RATE: 89 BPM | SYSTOLIC BLOOD PRESSURE: 104 MMHG | WEIGHT: 120.4 LBS

## 2023-06-08 DIAGNOSIS — Z11.1 TUBERCULOSIS SCREENING: ICD-10-CM

## 2023-06-08 DIAGNOSIS — Z91.030 BEE STING ALLERGY: ICD-10-CM

## 2023-06-08 DIAGNOSIS — G43.109 MIGRAINE WITH AURA AND WITHOUT STATUS MIGRAINOSUS, NOT INTRACTABLE: ICD-10-CM

## 2023-06-08 DIAGNOSIS — N89.8 VAGINAL ITCHING: ICD-10-CM

## 2023-06-08 DIAGNOSIS — Z12.4 CERVICAL CANCER SCREENING: ICD-10-CM

## 2023-06-08 DIAGNOSIS — Z00.00 ROUTINE GENERAL MEDICAL EXAMINATION AT A HEALTH CARE FACILITY: Primary | ICD-10-CM

## 2023-06-08 LAB
CLUE CELLS: NORMAL
TRICHOMONAS, WET PREP: NORMAL
WBC'S/HIGH POWER FIELD, WET PREP: NORMAL
YEAST, WET PREP: NORMAL

## 2023-06-08 PROCEDURE — 86481 TB AG RESPONSE T-CELL SUSP: CPT | Performed by: NURSE PRACTITIONER

## 2023-06-08 PROCEDURE — 87210 SMEAR WET MOUNT SALINE/INK: CPT | Performed by: NURSE PRACTITIONER

## 2023-06-08 PROCEDURE — 99213 OFFICE O/P EST LOW 20 MIN: CPT | Mod: 25 | Performed by: NURSE PRACTITIONER

## 2023-06-08 PROCEDURE — 36415 COLL VENOUS BLD VENIPUNCTURE: CPT | Performed by: NURSE PRACTITIONER

## 2023-06-08 PROCEDURE — 99385 PREV VISIT NEW AGE 18-39: CPT | Performed by: NURSE PRACTITIONER

## 2023-06-08 PROCEDURE — G0145 SCR C/V CYTO,THINLAYER,RESCR: HCPCS | Performed by: NURSE PRACTITIONER

## 2023-06-08 RX ORDER — EPINEPHRINE 0.3 MG/.3ML
0.3 INJECTION SUBCUTANEOUS PRN
Qty: 2 EACH | Refills: 1 | Status: SHIPPED | OUTPATIENT
Start: 2023-06-08

## 2023-06-08 ASSESSMENT — ENCOUNTER SYMPTOMS
MYALGIAS: 0
ABDOMINAL PAIN: 0
NERVOUS/ANXIOUS: 0
EYE PAIN: 0
HEMATURIA: 0
DIZZINESS: 0
DIARRHEA: 0
DYSURIA: 0
CONSTIPATION: 0
FREQUENCY: 0
HEADACHES: 1
PALPITATIONS: 0
HEARTBURN: 0
ARTHRALGIAS: 0
CHILLS: 0
BREAST MASS: 0
WEAKNESS: 0
FEVER: 0
PARESTHESIAS: 0
HEMATOCHEZIA: 0
SORE THROAT: 0
NAUSEA: 0
COUGH: 0
SHORTNESS OF BREATH: 0
JOINT SWELLING: 0

## 2023-06-08 NOTE — PROGRESS NOTES
SUBJECTIVE:   CC: Radha is an 27 year old who presents for preventive health visit.       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    Previously lived in [place] (went to [facility]), is now back at home with family and will move in with [employer] [facility]) for a year of discernment in August.        Have you ever done Advance Care Planning? (For example, a Health Directive, POLST, or a discussion with a medical provider or your loved ones about your wishes): No, advance care planning information given to patient to review.  Patient declined advance care planning discussion at this time.    Social History     Tobacco Use     Smoking status: Never     Smokeless tobacco: Never   Vaping Use     Vaping status: Not on file   Substance Use Topics     Alcohol use: No             6/8/2023    11:17 AM   Alcohol Use   Prescreen: >3 drinks/day or >7 drinks/week? Not Applicable          View : No data to display.              Reviewed orders with patient.  Reviewed health maintenance and updated orders accordingly - Yes  BP Readings from Last 3 Encounters:   06/08/23 104/60   08/20/19 108/60   07/06/19 110/74    Wt Readings from Last 3 Encounters:   06/08/23 54.6 kg (120 lb 6.4 oz)   08/20/19 59.4 kg (131 lb)   07/06/19 58.7 kg (129 lb 4.8 oz)                  Patient Active Problem List   Diagnosis     Seasonal allergies     Abdominal pain,  generalized     Migraine with aura and without status migrainosus, not intractable     Past Surgical History:   Procedure Laterality Date     SURGICAL HISTORY OF -       oral surgery       Social History     Tobacco Use     Smoking status: Never     Smokeless tobacco: Never   Vaping Use     Vaping status: Not on file   Substance Use Topics     Alcohol use: No     Family History   Problem Relation Age of Onset     Diabetes Mother         gestational only     Asthma Mother      Hypertension Maternal Grandmother      Diabetes Maternal Grandmother      C.A.D. Maternal Grandfather 49        cardiac arrest      Hypertension Paternal Grandmother      Cancer Paternal Grandfather         lung, brain and liver - smoker     Cerebrovascular Disease Maternal Aunt 40        vertebrae occlusion- stroke     Asthma Paternal Uncle          Current Outpatient Medications   Medication Sig Dispense Refill     EPINEPHrine (ANY BX GENERIC EQUIV) 0.3 MG/0.3ML injection 2-pack Inject 0.3 mLs (0.3 mg) into the muscle as needed for anaphylaxis May repeat one time in 5-15 minutes if response to initial dose is inadequate. 2 each 1     nortriptyline (PAMELOR) 10 MG capsule Take 1 capsule (10 mg) by mouth At Bedtime 30 capsule 6     rizatriptan (MAXALT-MLT) 5 MG ODT Take 1-2 tablets (5-10 mg) by mouth at onset of headache for migraine (may repeat in 2 hours as needed. Max 30 mg in 24 hours) 18 tablet 6       Breast Cancer Screenin/5/2023    12:03 PM   Breast CA Risk Assessment (FHS-7)   Do you have a family history of breast, colon, or ovarian cancer? No / Unknown         Patient under 40 years of age: Routine Mammogram Screening not recommended.   Pertinent mammograms are reviewed under the imaging tab.    History of abnormal Pap smear: NO - age 21-29 PAP every 3 years recommended      5/10/2019     1:43 PM   PAP / HPV   PAP (Historical) NIL       Reviewed and updated as needed this visit by clinical staff   Tobacco  Allergies   "Meds              Reviewed and updated as needed this visit by Provider                 Past Medical History:   Diagnosis Date     NO ACTIVE PROBLEMS       Past Surgical History:   Procedure Laterality Date     SURGICAL HISTORY OF -       oral surgery       Review of Systems   Constitutional: Negative for chills and fever.   HENT: Positive for congestion. Negative for ear pain, hearing loss and sore throat.    Eyes: Negative for pain and visual disturbance.   Respiratory: Negative for cough and shortness of breath.    Cardiovascular: Negative for chest pain, palpitations and peripheral edema.   Gastrointestinal: Negative for abdominal pain, constipation, diarrhea, heartburn, hematochezia and nausea.   Breasts:  Negative for tenderness, breast mass and discharge.   Genitourinary: Negative for dysuria, frequency, genital sores, hematuria, pelvic pain, urgency, vaginal bleeding and vaginal discharge.   Musculoskeletal: Negative for arthralgias, joint swelling and myalgias.   Skin: Negative for rash.   Neurological: Positive for headaches. Negative for dizziness, weakness and paresthesias.   Psychiatric/Behavioral: Negative for mood changes. The patient is not nervous/anxious.      OBJECTIVE:   /60   Pulse 89   Temp 97.7  F (36.5  C) (Tympanic)   Resp 16   Ht 1.651 m (5' 5\")   Wt 54.6 kg (120 lb 6.4 oz)   LMP 05/20/2023 (Exact Date)   SpO2 100%   BMI 20.04 kg/m       Physical Exam     GENERAL: healthy, alert and no distress  EYES: Eyes grossly normal to inspection  HENT: ear canals and TM's normal, nose and mouth without ulcers or lesions  NECK: no adenopathy, no asymmetry, masses, or scars and thyroid normal to palpation  RESP: lungs clear to auscultation - no rales, rhonchi or wheezes  BREAST: normal without masses, tenderness or nipple discharge and no palpable axillary masses or adenopathy  CV: regular rate and rhythm, normal S1 S2, no S3 or S4, no murmur, click or rub, no peripheral edema  ABDOMEN: " soft, nontender, no hepatosplenomegaly, no masses and bowel sounds normal   (female): normal female external genitalia, normal urethral meatus, vaginal mucosa pink, moist, well rugated, and normal cervix/adnexa/uterus without masses or discharge  MS: no gross musculoskeletal defects noted, no edema  SKIN: no suspicious lesions or rashes  NEURO: Normal strength and tone, mentation intact and speech normal  PSYCH: mentation appears normal, affect normal/bright    ASSESSMENT/PLAN:     Radha was seen today for physical.    Diagnoses and all orders for this visit:    Routine general medical examination at a health care facility    Cervical cancer screening  -     Pap Screen reflex to HPV if ASCUS - recommend age 25 - 29    Bee sting allergy  As needed use of Epi-pen for anaphylaxis reaction.    -     EPINEPHrine (ANY BX GENERIC EQUIV) 0.3 MG/0.3ML injection 2-pack; Inject 0.3 mLs (0.3 mg) into the muscle as needed for anaphylaxis May repeat one time in 5-15 minutes if response to initial dose is inadequate.    Vaginal itching  Not suggestive of vaginal yeast or bacterial vaginosis.   Recommend Aquaphor ointment to external labia as needed for itching.    -     Wet prep - Clinic Collect    Migraine with aura and without status migrainosus, not intractable  Following with neurology.   Reviewed indication and treatment plan from neurology.      Tuberculosis screening  -     Quantiferon TB Gold Plus    Other orders  -     REVIEW OF HEALTH MAINTENANCE PROTOCOL ORDERS      COUNSELING:  Reviewed preventive health counseling, as reflected in patient instructions        She reports that she has never smoked. She has never used smokeless tobacco.      Sis Ramos, [facility]

## 2023-06-08 NOTE — RESULT ENCOUNTER NOTE
Dear Radha,     -No signs of bacteria or yeast vaginal infections on the wet prep.        Please send a Meta[employer] message or call [phone]  if you have any questions.      GLENN Ventura, CNP  [facility]

## 2023-06-08 NOTE — PATIENT INSTRUCTIONS
[phone] - call to schedule MRI        Preventive Health Recommendations  Female Ages 26 - 39  Yearly exam:   See your health care provider every year in order to  Review health changes.   Discuss preventive care.    Review your medicines if you your doctor has prescribed any.    Until age 30: Get a Pap test every three years (more often if you have had an abnormal result).    After age 30: Talk to your doctor about whether you should have a Pap test every 3 years or have a Pap test with HPV screening every 5 years.   You do not need a Pap test if your uterus was removed (hysterectomy) and you have not had cancer.  You should be tested each year for STDs (sexually transmitted diseases), if you're at risk.   Talk to your provider about how often to have your cholesterol checked.  If you are at risk for diabetes, you should have a diabetes test (fasting glucose).  Shots: Get a flu shot each year. Get a tetanus shot every 10 years.   Nutrition:   Eat at least 5 servings of fruits and vegetables each day.  Eat whole-grain bread, whole-wheat pasta and brown rice instead of white grains and rice.  Get adequate Calcium and Vitamin D.     Lifestyle  Exercise at least 150 minutes a week (30 minutes a day, 5 days of the week). This will help you control your weight and prevent disease.  Limit alcohol to one drink per day.  No smoking.   Wear sunscreen to prevent skin cancer.  See your dentist every six months for an exam and cleaning.

## 2023-06-09 ENCOUNTER — TRANSFERRED RECORDS (OUTPATIENT)
Dept: HEALTH INFORMATION MANAGEMENT | Facility: CLINIC | Age: 28
End: 2023-06-09
Payer: COMMERCIAL

## 2023-06-09 LAB
GAMMA INTERFERON BACKGROUND BLD IA-ACNC: 0.01 IU/ML
M TB IFN-G BLD-IMP: NEGATIVE
M TB IFN-G CD4+ BCKGRND COR BLD-ACNC: 9.99 IU/ML
MITOGEN IGNF BCKGRD COR BLD-ACNC: 0 IU/ML
MITOGEN IGNF BCKGRD COR BLD-ACNC: 0.01 IU/ML
QUANTIFERON MITOGEN: 10 IU/ML
QUANTIFERON NIL TUBE: 0.01 IU/ML
QUANTIFERON TB1 TUBE: 0.01 IU/ML
QUANTIFERON TB2 TUBE: 0.02

## 2023-06-12 NOTE — RESULT ENCOUNTER NOTE
Dear Radha,     Screening test for tuberculosis was negative and reassuring.      Please send a [employer] message or call [phone]  if you have any questions.      GLENN Ventura, CNP  [facility]    If you have further questions about the interpretation of your labs, [email or web address] is a good website to check out for further information.

## 2023-06-13 LAB
BKR LAB AP GYN ADEQUACY: NORMAL
BKR LAB AP GYN INTERPRETATION: NORMAL
BKR LAB AP HPV REFLEX: NORMAL
BKR LAB AP PREVIOUS ABNORMAL: NORMAL
PATH REPORT.COMMENTS IMP SPEC: NORMAL
PATH REPORT.COMMENTS IMP SPEC: NORMAL
PATH REPORT.RELEVANT HX SPEC: NORMAL

## 2023-06-30 ENCOUNTER — HOSPITAL ENCOUNTER (OUTPATIENT)
Dept: MRI IMAGING | Facility: CLINIC | Age: 28
Discharge: HOME | End: 2023-06-30
Attending: NURSE PRACTITIONER | Admitting: NURSE PRACTITIONER
Payer: COMMERCIAL

## 2023-06-30 DIAGNOSIS — R51.9 WORSENING HEADACHES: ICD-10-CM

## 2023-06-30 PROCEDURE — 70551 MRI BRAIN STEM W/O DYE: CPT

## 2023-07-26 NOTE — RESULT ENCOUNTER NOTE
Angelito Garcia  Your images reviewed and no new abnormal findings at this time to explain your symptoms. Its reassuring. Treatment plan as discussed.   Take care,  Indira

## 2023-08-10 ENCOUNTER — DOCUMENTATION ONLY (OUTPATIENT)
Dept: FAMILY MEDICINE | Facility: CLINIC | Age: 28
End: 2023-08-10

## 2023-08-11 NOTE — PROGRESS NOTES
Chart reviewed in absence of JM. Just had physical without mention of return for labs. Can we verify with pt what she is seeking lab appt for? JM not back until 8/16 so could reschedule for her to review upon return if needed as well.   Lauren Capone PA-C

## 2023-08-14 ENCOUNTER — TELEPHONE (OUTPATIENT)
Dept: FAMILY MEDICINE | Facility: CLINIC | Age: 28
End: 2023-08-14

## 2023-08-14 NOTE — TELEPHONE ENCOUNTER
"Duplicate encounter. Copied my notes from enc dated 8/10 regarding same that does not appear to have been addressed yet. Please see notes below and verify why pt seeking lab appt or reschedule upon Sis's return to review with her as noted below.  Lauren Capone PA-C      \"Chart reviewed in absence of JM. Just had physical without mention of return for labs. Can we verify with pt what she is seeking lab appt for? JM not back until 8/16 so could reschedule for her to review upon return if needed as well.   Lauren Capone PA-C\"  "

## 2023-08-14 NOTE — TELEPHONE ENCOUNTER
Radha is scheduled for a lab appointment tomorrow, but there are no orders in. Will you send those in?

## 2023-08-15 NOTE — TELEPHONE ENCOUNTER
Patient did not come to lab apt today,     Called and explained will send to Sis for orders and will call her back once orders and help schedule.     LOV 6/2023- physical     Tatiana JAMES RN   [facility] Triage

## 2023-08-15 NOTE — TELEPHONE ENCOUNTER
Patient is coming to the lab for Physical lab work only and is fasting.  Please have covering Provider order or contact Patient if this is not possible.

## 2023-08-15 NOTE — PROGRESS NOTES
Attempt #1  Called Phone #[phone]    Left a non detailed voicemail to please call back and ask for any available triage nurse regarding your  lab appointment today @ [phone].     Tatiana JAMSE RN   [facility] Triage

## 2023-08-16 NOTE — PROGRESS NOTES
I have not seen pt since 2019.  Defer any lab orders to JM as she had recent physical 6/8/2023       Juliet Clemons MBA, MS, PA-C  [facility]

## 2023-08-16 NOTE — PROGRESS NOTES
No indication for labs at this time.     Did patient clarify what labs were requested?     - Tal, CNP

## 2023-08-16 NOTE — TELEPHONE ENCOUNTER
This is a duplicate encounter.    Is patient able to clarify requested labs?    No indication for additional labs at this time.     - Tal, CNP

## 2023-09-13 ASSESSMENT — HEADACHE IMPACT TEST (HIT 6)
HOW OFTEN DO HEADACHES LIMIT YOUR DAILY ACTIVITIES: SOMETIMES
HOW OFTEN DID HEADACHS LIMIT CONCENTRATION ON WORK OR DAILY ACTIVITY: SOMETIMES
WHEN YOU HAVE HEADACHES HOW OFTEN IS THE PAIN SEVERE: SOMETIMES
HIT6 TOTAL SCORE: 57
HOW OFTEN HAVE YOU FELT FED UP OR IRRITATED BECAUSE OF YOUR HEADACHES: RARELY
HOW OFTEN HAVE YOU FELT TOO TIRED TO WORK BECAUSE OF YOUR HEADACHES: RARELY
WHEN YOU HAVE A HEADACHE HOW OFTEN DO YOU WISH YOU COULD LIE DOWN: VERY OFTEN

## 2023-09-15 ENCOUNTER — VIRTUAL VISIT (OUTPATIENT)
Dept: NEUROLOGY | Facility: CLINIC | Age: 28
End: 2023-09-15
Payer: COMMERCIAL

## 2023-09-15 DIAGNOSIS — G43.109 MIGRAINE WITH AURA AND WITHOUT STATUS MIGRAINOSUS, NOT INTRACTABLE: Primary | ICD-10-CM

## 2023-09-15 DIAGNOSIS — G43.709 CHRONIC MIGRAINE WITHOUT AURA WITHOUT STATUS MIGRAINOSUS, NOT INTRACTABLE: ICD-10-CM

## 2023-09-15 PROCEDURE — 99212 OFFICE O/P EST SF 10 MIN: CPT | Mod: 95 | Performed by: NURSE PRACTITIONER

## 2023-09-15 NOTE — LETTER
9/15/2023       RE: Radha Blanco  [address] [facility] [address]     Dear Colleague,    Thank you for referring your patient, Radha Blanco, to the [facility] [place] at [facility]. Please see a copy of my visit note below.    Radha is a 28 year old who is being evaluated via a billable video visit.      Subjective  Radha is a 28 year old, presenting for the following health issues: headache  RECHECK  Before nortriptyline -headaches were frequent and severe and now headaches more frequent dull headaches and less severe about 4 vs 10 per month.   Headaches are not as sharp but still behind left or right eye.   Has been taking nortriptyline daily but had noticed dull/minor headaches but number of severe headaches decreased -only 4 the past month and with those 4 -took rizatriptan 10 mg -worked 50 %-with two out of 4 migraines.   When it did not work -lay down and went to sleep. Usually manageable. Sometimes lingering pain.   No new concerns   Patient is curious  about nortriptyline  No side effects with nortriptyline    Joint a convent end of August and no changes in headaches due to changes     Plan:  Continue nortriptyline 10 mg at bedtime for headache prevention   Rizatriptan as needed   Stay hydrated  Follow up in 3 month in person or sooner if needed if headaches     DATA reviewed  MRI BRAIN WITHOUT CONTRAST  6/30/2023 8:29 AM     FINDINGS: The cerebral hemispheres, brainstem, and cerebellum  demonstrate normal morphology and signal. No evidence of ischemia,  hemorrhage, mass, or hydrocephalus. Cerebellar tonsils in normal  position. Major intracranial flow-voids are maintained.     Marrow signal is within normal limits. The visualized paranasal  sinuses, tympanic cavities, and mastoid cavities are unremarkable.                                                                      IMPRESSION: Unremarkable MRI of the head.      RADHA LARSON MD   Last Patient-Answered HIT-6 Questionnaire      9/13/2023     6:49 PM   HIT-6   When you have headaches, how often is the pain severe 10   How often do headaches limit your ability to do usual daily activities including household work, work, school, or social activities? 10   When you have a headache, how often do you wish you could lie down? 11   In the past 4 weeks, how often have you felt too tired to do work or daily activities because of your headaches 8   In the past 4 weeks, how often have you felt fed up or irritated because of your headaches 8   In the past 4 weeks, how often did headaches limit your ability to concentrate on work or daily activities 10   HIT-6 Total Score 57     Objective   Vitals - Patient Reported  Weight (Patient Reported): 54.4 kg (120 lb)  Pain Score: No Pain (0)    Physical Exam   GENERAL: Healthy, alert and no distress  RESP: No audible wheeze, cough, or visible cyanosis.  No visible retractions or increased work of breathing.    NEURO: Cranial nerves grossly intact.  Mentation and speech appropriate for age.  PSYCH: Mentation appears normal, affect normal, judgement and insight intact, normal speech and appearance well-groomed.    I discussed all my recommendations with Radha Blanco who verbalizes understanding and comfortable with the plan.      18 minutes spent on the date of the encounter doing video access, chart  review, results review,  meds review, treatment plan, documentation and further activities as noted above        Again, thank you for allowing me to participate in the care of your patient.      Sincerely,    GLENN Mcadams CNP

## 2023-09-15 NOTE — NURSING NOTE
Is the patient currently in the state of [place]? YES    Visit mode:VIDEO    If the visit is dropped, the patient can be reconnected by: VIDEO VISIT: Text to cell phone:   Telephone Information:   Mobile [phone]       Will anyone else be joining the visit? NO  (If patient encounters technical issues they should call [phone]    How would you like to obtain your AVS? MyChart    Are changes needed to the allergy or medication list? No, Pt stated no changes to allergies, and Pt stated no med changes    Reason for visit: OG FORST

## 2023-09-15 NOTE — PROGRESS NOTES
Radha is a 28 year old who is being evaluated via a billable video visit.      Subjective   Radha is a 28 year old, presenting for the following health issues: headache  RECHECK  Before nortriptyline -headaches were frequent and severe and now headaches more frequent dull headaches and less severe about 4 vs 10 per month.   Headaches are not as sharp but still behind left or right eye.   Has been taking nortriptyline daily but had noticed dull/minor headaches but number of severe headaches decreased -only 4 the past month and with those 4 -took rizatriptan 10 mg -worked 50 %-with two out of 4 migraines.   When it did not work -lay down and went to sleep. Usually manageable. Sometimes lingering pain.   No new concerns   Patient is curious  about nortriptyline  No side effects with nortriptyline    Joint a convent end of August and no changes in headaches due to changes     Plan:  Continue nortriptyline 10 mg at bedtime for headache prevention   Rizatriptan as needed   Stay hydrated  Follow up in 3 month in person or sooner if needed if headaches     DATA reviewed  MRI BRAIN WITHOUT CONTRAST  6/30/2023 8:29 AM     FINDINGS: The cerebral hemispheres, brainstem, and cerebellum  demonstrate normal morphology and signal. No evidence of ischemia,  hemorrhage, mass, or hydrocephalus. Cerebellar tonsils in normal  position. Major intracranial flow-voids are maintained.     Marrow signal is within normal limits. The visualized paranasal  sinuses, tympanic cavities, and mastoid cavities are unremarkable.                                                                      IMPRESSION: Unremarkable MRI of the head.     RADHA LARSON MD   Last Patient-Answered HIT-6 Questionnaire      9/13/2023     6:49 PM   HIT-6   When you have headaches, how often is the pain severe 10   How often do headaches limit your ability to do usual daily activities including household work, work, school, or social activities? 10   When you have a  headache, how often do you wish you could lie down? 11   In the past 4 weeks, how often have you felt too tired to do work or daily activities because of your headaches 8   In the past 4 weeks, how often have you felt fed up or irritated because of your headaches 8   In the past 4 weeks, how often did headaches limit your ability to concentrate on work or daily activities 10   HIT-6 Total Score 57     Objective    Vitals - Patient Reported  Weight (Patient Reported): 54.4 kg (120 lb)  Pain Score: No Pain (0)    Physical Exam   GENERAL: Healthy, alert and no distress  RESP: No audible wheeze, cough, or visible cyanosis.  No visible retractions or increased work of breathing.    NEURO: Cranial nerves grossly intact.  Mentation and speech appropriate for age.  PSYCH: Mentation appears normal, affect normal, judgement and insight intact, normal speech and appearance well-groomed.    I discussed all my recommendations with Radha Blanco who verbalizes understanding and comfortable with the plan.      18 minutes spent on the date of the encounter doing video access, chart  review, results review,  meds review, treatment plan, documentation and further activities as noted above    GLENN Guillen, CNP [facility]  Headache certified  [facility]      Video-Visit Details  Type of service:  Video Visit   Originating Location (pt. Location): Home  Distant Location (provider location):  Off-site  Platform used for Video Visit: light

## 2024-03-13 DIAGNOSIS — G43.109 MIGRAINE WITH AURA AND WITHOUT STATUS MIGRAINOSUS, NOT INTRACTABLE: ICD-10-CM

## 2024-03-13 NOTE — CONFIDENTIAL NOTE
Rx Authorization:  Requested Medication/ Dose: Nortriptyline 10MG Caps  Date last refill ordered: 6/1/23  Quantity ordered: 30 capss  # refills: 6  Date of last clinic visit with ordering provider: 9/15/23  Date of next clinic visit with ordering provider: F/U 3-4 months  All pertinent protocol data (lab date/result):   Include pertinent information from patients message:

## 2024-03-14 RX ORDER — NORTRIPTYLINE HCL 10 MG
10 CAPSULE ORAL AT BEDTIME
Qty: 30 CAPSULE | Refills: 6 | Status: SHIPPED | OUTPATIENT
Start: 2024-03-14

## 2024-05-09 ENCOUNTER — PATIENT OUTREACH (OUTPATIENT)
Dept: CARE COORDINATION | Facility: CLINIC | Age: 29
End: 2024-05-09
Payer: COMMERCIAL

## 2024-05-23 ENCOUNTER — PATIENT OUTREACH (OUTPATIENT)
Dept: CARE COORDINATION | Facility: CLINIC | Age: 29
End: 2024-05-23
Payer: COMMERCIAL

## 2024-08-04 ENCOUNTER — HEALTH MAINTENANCE LETTER (OUTPATIENT)
Age: 29
End: 2024-08-04

## 2025-08-16 ENCOUNTER — HEALTH MAINTENANCE LETTER (OUTPATIENT)
Age: 30
End: 2025-08-16